# Patient Record
(demographics unavailable — no encounter records)

---

## 2024-10-30 NOTE — DATA REVIEWED
[Lumbar Spine] : lumbar spine [Thoracic Spine] : thoracic spine [MRI] : MRI [Pelvis] : pelvis [Report was reviewed and noted in the chart] : The report was reviewed and noted in the chart [I independently reviewed and interpreted images and report] : I independently reviewed and interpreted images and report [I reviewed the films/CD] : I reviewed the films/CD

## 2024-10-31 NOTE — HISTORY OF PRESENT ILLNESS
[Lower back] : lower back [6] : 6 [Radiating] : radiating [Shooting] : shooting [Throbbing] : throbbing [Constant] : constant [Household chores] : household chores [Leisure] : leisure [Sleep] : sleep [Nothing helps with pain getting better] : Nothing helps with pain getting better [FreeTextEntry1] : 10/31/2024 - Patient presents for reevaluation regarding their lower back pain. Patient reports no change in his symptoms. His pain is predominantly across his lower back and buttocks. He is experiencing an increase in subjective weakness in b/l legs which is his major concern. He followed up with Dr. Gongora to evaluate his hips and received b/l hip US CSI with no benefit.   6/3/2024 - Patient presents for FUV regarding their lower back pain. Patient reports that about 3 weeks ago he started to have a return of bilateral hip and buttock pain that radiates bilaterally down the lateral lower extremities, he continues to have pain across the lower back as well; he is currently involved in formal PT. he states that his pain had largely been resolved for over 2 months.  Sleep was improved.  3/14/2024 - Patient presents for FUV to review their pelvis MRI from 2024. Patient reports that he got 80-90% reduction of bilateral hip pain s/p GT bursa injections from the previous visit.  He no longer has right sided hip pain.  He continues to have intermittent left sided hip pain, and pain across the lower back.   2024 - Patient presents for FUV regarding his bilateral hip pain. Patient reports an increase of his right sided hip and radicular right calf pain since Thursday; laying on either side will exacerbate pain on the corresponding side. Patient will receive GTB TPI in office today.  Had MRI of the pelvis performed however no official radiological interpretation available at time of visit.  2024 - Patient presents for lead removal from [place] DCS trial 2024.  Unfortunately, he did not experience significant reduction of symptoms.  Pain is across the lower back with radiation bilaterally to the buttocks and lateral upper thighs; the pain is at its worst in the evening and night, and when laying on either side.   2024 - Patient presents for FUV regarding their lower back pain. Patient failed gabapentin and self discontinues, and he states that he is starting to have subjective weakness bilaterally in the lower extremities. His pain is across the lower back with radiation bilaterally to the buttocks and lateral upper thighs; the pain is at its worst in the evening and night.  Wishes to discuss DCS trial in further detail.  2023 - Patient presents for FUV.  He was last seen approximately 6 months ago.  Patient underwent a bilateral L4-5, L5-S1 facet joint mbb on 3/22/2023.  Patient reports no meaningful relief.  He c/o axial back pain, radiation into his BL buttocks and occasionally shoots down his leg.  Had orthopedic spine surgical reevaluation with Dr. Farfan.  At this point the patient remains uninterested in pursuing surgical intervention.  He presents today to discuss potential alternative treatment options.     3/9/2023- Patient presents for FUV regarding his lower back pain, had a lumbar spine MRI on 2023. Patient reports lower back pain, with radiation bilaterally to the buttocks and lower extremities (Pain distribution 80% back / 20% buttock/legs). He notes that the buttock pain is most prominent at night while sleeping; the lower back alternates in intensity between left and right depending on the day. He takes OTC NSAIDs for pain management with minimal reported benefit.  2023 - Presents for follow-up visit.  He was last seen for L4-L5 LESI on 10/28/2022.  He reports the most recent JENNIFER did not help at all compared to the first JENNIFER that he received.  He complains of axial back pain with radiation to his b/l buttock stopping at his calf.  He states lying flat seems to exacerbate the buttock pain.  Patient tried NSAIDs with minimal relief.  He is in the process of obtaining second opinion from spine surgery.    10/13/22 - Presents after left PM L4-L5 LESI on 22.  He reports more than 50% pain relief x 2 months, pain has since returned to baseline. Majority of his pain comes when he sleeps. Pain continues down b/l LE.  Has completed medical work-up for potential alternate sources of leg pain which have been reported as negative.  22 - Patient presents for a FUV. He reports he self d/c Celebrex due to perceived headaches and fatigue. Patient c/o low back pain radiating to the left buttock. Increase of pain with prolonged sitting. Patient reports 50/50 low back verse left buttock pain.   22 - Patient presents for a FUV following an left L4-5 LESI on 22. Patient reports injection was effective in reducing bilateral thigh symptoms. He reports 2-week benefit with both JENNIFER's. Patient reports residual low back pain radiating to the left buttocks. Patient reports he had PCP workup for leg cramping.   22 - Patient presents for a FUV following a bilateral L4-5 TFESI on 22. Patient states complete resolution the first 3 days post injection followed by minimal sustained relief. Patients' c/o bilateral leg pain / cramping with prolonged sitting. Patient states pain is worse in the evening.   22 - Patient presents for initial evaluation. Patient c/o lower back pain radiating into his bilateral buttocks and bilateral thighs. Patient rates his pain as 50-50 lower back vs lower extremity pain. Patient reports a longstanding history of this pain that has been worsening over the past 6 weeks due to no specific event. Patient recently started PT and has not noticed any appreciable reduction in his pain. Patient was prescribed Lyrica, however has not been able to start. Patient reports that sitting provokes his pain, walking does not. Patient reports that sitting provokes his lower extremity paresthesia's.   Interventional pain history (Dr. Sinha): 1) Bilateral L4-5 TFESI (22) 2) left PM L4-5 LESI (22, 22, 10/28/2022)  3) Bilateral L4-5, L5-S1 facet joint MBB (3/22/2023) 4) [place] DCS trial implant (2024) 5) Bilateral GTB Injection (2024)  Injection therapies (outside MD): 1) B/L hip joint CSI with U/S guidance - Dr. Gongora   Pertinent Surgical History: N/A  Imagin) MRI Lumbar Spine (2024)- [facility]  At L5-S1: There is facet arthropathy and trace anterolisthesis Significant spinal canal stenosis or nerve root compression. There is no significant change from previous study. At L4-5: There is facet arthropathy and grade 1 anterolisthesis. There is mild to moderate bilateral neural foraminal stenosis. There is moderate stenosis of right subarticular recess. There is no significant change from previous study. At L3-4: Disc bulge and left subarticular/foraminal disc herniation with interval increase in size from previous examination. Disc herniation compresses the descending left L4 nerve root in subarticular recess. There is also subarticular recess stenosis on the right with suspected mild impingement descending right L4 nerve root. There is moderate right and mild left neuroforaminal stenosis with disc contacting exiting nerve root on the left. At L2-3: There is minimal disc bulge and facet arthropathy without significant spinal canal or neural foraminal stenosis. At L1-2: There is disc bulge and annular fissure without significant spinal canal or neural foraminal stenosis.  2) MRI Thoracic Spine (2024) - [facility]  3) MRI Pelvis (2024) - [facility] Pelvis: T1 marrow signal intensity is maintained, without evidence for aggressive osseous lesion or fracture. There is no marrow edema. The sacroiliac joints are intact, without ankylosis or erosive change. The coccyx is intact. Hips: Both femoral heads demonstrate a spherical contour without evidence for avascular necrosis. Mild bilateral hip osteoarthritis with moderate spurring about the acetabulum is seen in association with mild collar osteophytosis. Please note that this study was not tailored to evaluate for internal derangement. No hip joint effusion is seen. Lower lumbar spine: Lower lumbar spondylosis is partially imaged in association with facet arthrosis. There is moderate stress reaction about both L4-L5 and L5-S1 facet joints. Muscles/tendons: There is no disproportionate muscle atrophy or intramuscular edema. Insertional bilateral gluteal peritendinitis is noted. There is mild bilateral chronic hamstring origin tendinosis/old partial tear. Neurovascular structures: The fat planes surrounding both sciatic nerves are maintained. No significant groin lymphadenopathy is appreciated. Miscellaneous: The subcutaneous tissues are within normal limits. Small bilateral fat-containing groin hernias are partially imaged.  Physician Disclaimer: I have personally reviewed and confirmed all HPI data with the patient. [] : no [FreeTextEntry6] : numbness [FreeTextEntry7] : rt leg [de-identified] : sleeping [de-identified] : l mri

## 2024-10-31 NOTE — PHYSICAL EXAM
[de-identified] : Constitutional:  - No acute distress  - Well developed; well nourished   Neurological:  - normal mood and affect  - alert and oriented x 3    Cardiovascular:  - grossly normal  Lumbar Spine Exam:   Inspection:  erythema (-)  ecchymosis (-)  rashes (-)  alignment: no scoliosis  Palpation:   paraspinal tenderness:                  L (+) ; R (+)  thoracic paraspinal tenderness:    L (-) ; R (-)  sciatic nerve tenderness :             L (-) ; R (-)  SI joint tenderness:                        L (-); R (-)  GTB tenderness:                            L (-); R (-)   ROM:    WNL; stiffness at extremes of extension pain with extension > flexion  Strength:                   Right       Left     Hip Flexion:                (4/5)       (4/5)  Quadriceps:               (5/5)       (5/5)  Hamstrings:                (5/5)       (5/5)  Ankle Dorsiflexion:     (4+/5)       (4+/5)  EHL:                            (5/5)       (5/5)  Ankle Plantarflexion:  (5/5)       (5/5)   Special Tests:  SLR:                      R (-); L (-)  Facet loading:       R (+); L (+)  MONTRELL test:          R (+); L (+) Tight Hamstrings   R (+); L (+)   Neurologic:  Light touch intact throughout LE bilaterally  Reflexes normal and symmetric bilaterally  Gait:  mildly- antalgic gait

## 2024-10-31 NOTE — HISTORY OF PRESENT ILLNESS
[Lower back] : lower back [6] : 6 [Radiating] : radiating [Shooting] : shooting [Throbbing] : throbbing [Constant] : constant [Household chores] : household chores [Leisure] : leisure [Sleep] : sleep [Nothing helps with pain getting better] : Nothing helps with pain getting better [FreeTextEntry1] : 10/31/2024 - Patient presents for reevaluation regarding their lower back pain. Patient reports no change in his symptoms. His pain is predominantly across his lower back and buttocks. He is experiencing an increase in subjective weakness in b/l legs which is his major concern. He followed up with Dr. Gongora to evaluate his hips and received b/l hip US CSI with no benefit.   6/3/2024 - Patient presents for FUV regarding their lower back pain. Patient reports that about 3 weeks ago he started to have a return of bilateral hip and buttock pain that radiates bilaterally down the lateral lower extremities, he continues to have pain across the lower back as well; he is currently involved in formal PT. he states that his pain had largely been resolved for over 2 months.  Sleep was improved.  3/14/2024 - Patient presents for FUV to review their pelvis MRI from 2024. Patient reports that he got 80-90% reduction of bilateral hip pain s/p GT bursa injections from the previous visit.  He no longer has right sided hip pain.  He continues to have intermittent left sided hip pain, and pain across the lower back.   2024 - Patient presents for FUV regarding his bilateral hip pain. Patient reports an increase of his right sided hip and radicular right calf pain since Thursday; laying on either side will exacerbate pain on the corresponding side. Patient will receive GTB TPI in office today.  Had MRI of the pelvis performed however no official radiological interpretation available at time of visit.  2024 - Patient presents for lead removal from [place] DCS trial 2024.  Unfortunately, he did not experience significant reduction of symptoms.  Pain is across the lower back with radiation bilaterally to the buttocks and lateral upper thighs; the pain is at its worst in the evening and night, and when laying on either side.   2024 - Patient presents for FUV regarding their lower back pain. Patient failed gabapentin and self discontinues, and he states that he is starting to have subjective weakness bilaterally in the lower extremities. His pain is across the lower back with radiation bilaterally to the buttocks and lateral upper thighs; the pain is at its worst in the evening and night.  Wishes to discuss DCS trial in further detail.  2023 - Patient presents for FUV.  He was last seen approximately 6 months ago.  Patient underwent a bilateral L4-5, L5-S1 facet joint mbb on 3/22/2023.  Patient reports no meaningful relief.  He c/o axial back pain, radiation into his BL buttocks and occasionally shoots down his leg.  Had orthopedic spine surgical reevaluation with Dr. Farfan.  At this point the patient remains uninterested in pursuing surgical intervention.  He presents today to discuss potential alternative treatment options.     3/9/2023- Patient presents for FUV regarding his lower back pain, had a lumbar spine MRI on 2023. Patient reports lower back pain, with radiation bilaterally to the buttocks and lower extremities (Pain distribution 80% back / 20% buttock/legs). He notes that the buttock pain is most prominent at night while sleeping; the lower back alternates in intensity between left and right depending on the day. He takes OTC NSAIDs for pain management with minimal reported benefit.  2023 - Presents for follow-up visit.  He was last seen for L4-L5 LESI on 10/28/2022.  He reports the most recent JENNIFER did not help at all compared to the first JENNIFER that he received.  He complains of axial back pain with radiation to his b/l buttock stopping at his calf.  He states lying flat seems to exacerbate the buttock pain.  Patient tried NSAIDs with minimal relief.  He is in the process of obtaining second opinion from spine surgery.    10/13/22 - Presents after left PM L4-L5 LESI on 22.  He reports more than 50% pain relief x 2 months, pain has since returned to baseline. Majority of his pain comes when he sleeps. Pain continues down b/l LE.  Has completed medical work-up for potential alternate sources of leg pain which have been reported as negative.  22 - Patient presents for a FUV. He reports he self d/c Celebrex due to perceived headaches and fatigue. Patient c/o low back pain radiating to the left buttock. Increase of pain with prolonged sitting. Patient reports 50/50 low back verse left buttock pain.   22 - Patient presents for a FUV following an left L4-5 LESI on 22. Patient reports injection was effective in reducing bilateral thigh symptoms. He reports 2-week benefit with both JENNIFER's. Patient reports residual low back pain radiating to the left buttocks. Patient reports he had PCP workup for leg cramping.   22 - Patient presents for a FUV following a bilateral L4-5 TFESI on 22. Patient states complete resolution the first 3 days post injection followed by minimal sustained relief. Patients' c/o bilateral leg pain / cramping with prolonged sitting. Patient states pain is worse in the evening.   22 - Patient presents for initial evaluation. Patient c/o lower back pain radiating into his bilateral buttocks and bilateral thighs. Patient rates his pain as 50-50 lower back vs lower extremity pain. Patient reports a longstanding history of this pain that has been worsening over the past 6 weeks due to no specific event. Patient recently started PT and has not noticed any appreciable reduction in his pain. Patient was prescribed Lyrica, however has not been able to start. Patient reports that sitting provokes his pain, walking does not. Patient reports that sitting provokes his lower extremity paresthesia's.   Interventional pain history (Dr. Sinha): 1) Bilateral L4-5 TFESI (22) 2) left PM L4-5 LESI (22, 22, 10/28/2022)  3) Bilateral L4-5, L5-S1 facet joint MBB (3/22/2023) 4) [place] DCS trial implant (2024) 5) Bilateral GTB Injection (2024)  Injection therapies (outside MD): 1) B/L hip joint CSI with U/S guidance - Dr. Gongora   Pertinent Surgical History: N/A  Imagin) MRI Lumbar Spine (2024)- [facility]  At L5-S1: There is facet arthropathy and trace anterolisthesis Significant spinal canal stenosis or nerve root compression. There is no significant change from previous study. At L4-5: There is facet arthropathy and grade 1 anterolisthesis. There is mild to moderate bilateral neural foraminal stenosis. There is moderate stenosis of right subarticular recess. There is no significant change from previous study. At L3-4: Disc bulge and left subarticular/foraminal disc herniation with interval increase in size from previous examination. Disc herniation compresses the descending left L4 nerve root in subarticular recess. There is also subarticular recess stenosis on the right with suspected mild impingement descending right L4 nerve root. There is moderate right and mild left neuroforaminal stenosis with disc contacting exiting nerve root on the left. At L2-3: There is minimal disc bulge and facet arthropathy without significant spinal canal or neural foraminal stenosis. At L1-2: There is disc bulge and annular fissure without significant spinal canal or neural foraminal stenosis.  2) MRI Thoracic Spine (2024) - [facility]  3) MRI Pelvis (2024) - [facility] Pelvis: T1 marrow signal intensity is maintained, without evidence for aggressive osseous lesion or fracture. There is no marrow edema. The sacroiliac joints are intact, without ankylosis or erosive change. The coccyx is intact. Hips: Both femoral heads demonstrate a spherical contour without evidence for avascular necrosis. Mild bilateral hip osteoarthritis with moderate spurring about the acetabulum is seen in association with mild collar osteophytosis. Please note that this study was not tailored to evaluate for internal derangement. No hip joint effusion is seen. Lower lumbar spine: Lower lumbar spondylosis is partially imaged in association with facet arthrosis. There is moderate stress reaction about both L4-L5 and L5-S1 facet joints. Muscles/tendons: There is no disproportionate muscle atrophy or intramuscular edema. Insertional bilateral gluteal peritendinitis is noted. There is mild bilateral chronic hamstring origin tendinosis/old partial tear. Neurovascular structures: The fat planes surrounding both sciatic nerves are maintained. No significant groin lymphadenopathy is appreciated. Miscellaneous: The subcutaneous tissues are within normal limits. Small bilateral fat-containing groin hernias are partially imaged.  Physician Disclaimer: I have personally reviewed and confirmed all HPI data with the patient. [] : no [FreeTextEntry6] : numbness [FreeTextEntry7] : rt leg [de-identified] : sleeping [de-identified] : l mri

## 2024-10-31 NOTE — ASSESSMENT
[FreeTextEntry1] : A thorough discussion occurred regarding available pain management treatment options including interventional, rehabilitative, pharmacological, and alternative modalities with the patient. We will proceed with the following:  Interventional treatment options: - None indicated at present time - would proceed with repeat bilateral GTB CSI with predominant complaints of lateral hip pain - Non-sustained relief s/p multiple attempts at lumbar JENNIFER; would not recommend further at this point - Counseled patient regarding limitations of corticosteroid administration overall - see additional instructions below  Neuromodulation treatment options: - Patient s/p [place] DCS trial - Will not move forward with permanent implant given minimal reduction of symptoms during trial  Rehabilitative options: - Completed prior physical therapy trials - encouraged participation in HEP as tolerated - home exercise sheet provided at prior visit  Medication based treatment options: - failed several OTC and prescription NSAIDs trial; he defers further consideration - Continue meloxicam 7.5-15 mg daily as needed - Continue Voltaren gel up to TID as needed - Continue topical OTC analgesics (i.e. Lidoderm patch) - Failed prior Lyrica and gabapentin trial - see additional instructions below  Complementary treatment options: - Weight management and lifestyle modifications discussed  Additional treatment recommendations as follows: - Follow-up with orthopedic surgery re: bilateral hips as directed - Advised neurology evaluation for complaints of bilateral hip flexor weakness; would likely require EMG/NCV - Patient will follow-up on as-needed basis  We have discussed the risks, benefits, and alternatives NSAID therapy including but not limited to the risk of bleeding, thrombosis, gastric mucosal irritation/ulceration, allergic reaction and kidney dysfunction; the patient verbalizes an understanding.  I, Willa Crews, acting as scribe, attest that this documentation has been prepared under the direction and in the presence of Provider Christian Sniha DO.  The documentation recorded by the scribe, in my presence, accurately reflects the service I personally performed, and the decisions made by me with my edits as appropriate.

## 2024-10-31 NOTE — ASSESSMENT
[FreeTextEntry1] : A thorough discussion occurred regarding available pain management treatment options including interventional, rehabilitative, pharmacological, and alternative modalities with the patient. We will proceed with the following:  Interventional treatment options: - None indicated at present time - would proceed with repeat bilateral GTB CSI with predominant complaints of lateral hip pain - Non-sustained relief s/p multiple attempts at lumbar JENNIFER; would not recommend further at this point - Counseled patient regarding limitations of corticosteroid administration overall - see additional instructions below  Neuromodulation treatment options: - Patient s/p [place] DCS trial - Will not move forward with permanent implant given minimal reduction of symptoms during trial  Rehabilitative options: - Completed prior physical therapy trials - encouraged participation in HEP as tolerated - home exercise sheet provided at prior visit  Medication based treatment options: - failed several OTC and prescription NSAIDs trial; he defers further consideration - Continue meloxicam 7.5-15 mg daily as needed - Continue Voltaren gel up to TID as needed - Continue topical OTC analgesics (i.e. Lidoderm patch) - Failed prior Lyrica and gabapentin trial - see additional instructions below  Complementary treatment options: - Weight management and lifestyle modifications discussed  Additional treatment recommendations as follows: - Follow-up with orthopedic surgery re: bilateral hips as directed - Advised neurology evaluation for complaints of bilateral hip flexor weakness; would likely require EMG/NCV - Patient will follow-up on as-needed basis  We have discussed the risks, benefits, and alternatives NSAID therapy including but not limited to the risk of bleeding, thrombosis, gastric mucosal irritation/ulceration, allergic reaction and kidney dysfunction; the patient verbalizes an understanding.  I, Willa Crews, acting as scribe, attest that this documentation has been prepared under the direction and in the presence of Provider Christian Sinha DO.  The documentation recorded by the scribe, in my presence, accurately reflects the service I personally performed, and the decisions made by me with my edits as appropriate.

## 2024-10-31 NOTE — PHYSICAL EXAM
[de-identified] : Constitutional:  - No acute distress  - Well developed; well nourished   Neurological:  - normal mood and affect  - alert and oriented x 3    Cardiovascular:  - grossly normal  Lumbar Spine Exam:   Inspection:  erythema (-)  ecchymosis (-)  rashes (-)  alignment: no scoliosis  Palpation:   paraspinal tenderness:                  L (+) ; R (+)  thoracic paraspinal tenderness:    L (-) ; R (-)  sciatic nerve tenderness :             L (-) ; R (-)  SI joint tenderness:                        L (-); R (-)  GTB tenderness:                            L (-); R (-)   ROM:    WNL; stiffness at extremes of extension pain with extension > flexion  Strength:                   Right       Left     Hip Flexion:                (4/5)       (4/5)  Quadriceps:               (5/5)       (5/5)  Hamstrings:                (5/5)       (5/5)  Ankle Dorsiflexion:     (4+/5)       (4+/5)  EHL:                            (5/5)       (5/5)  Ankle Plantarflexion:  (5/5)       (5/5)   Special Tests:  SLR:                      R (-); L (-)  Facet loading:       R (+); L (+)  MONTRELL test:          R (+); L (+) Tight Hamstrings   R (+); L (+)   Neurologic:  Light touch intact throughout LE bilaterally  Reflexes normal and symmetric bilaterally  Gait:  mildly- antalgic gait

## 2025-01-09 NOTE — PROCEDURE
[Large Joint Injection] : Large joint injection [Bilateral] : bilaterally of the [Pain] : pain [Ethyl Chloride sprayed topically] : ethyl chloride sprayed topically [Sterile technique used] : sterile technique used [___ cc    0.25%] : Bupivacaine (Marcaine) ~Vcc of 0.25%  [___ cc    40mg] : Triamcinolone (Kenalog) ~Vcc of 40 mg  [] : Patient tolerated procedure well [Call if redness, pain or fever occur] : call if redness, pain or fever occur [Apply ice for 15min out of every hour for the next 12-24 hours as tolerated] : apply ice for 15 minutes out of every hour for the next 12-24 hours as tolerated [Patient had decreased mobility in the joint] : patient had decreased mobility in the joint [Risks, benefits, alternatives discussed / Verbal consent obtained] : the risks benefits, and alternatives have been discussed, and verbal consent was obtained

## 2025-01-10 NOTE — PHYSICAL EXAM
[de-identified] : Constitutional:  - No acute distress  - Well developed; well nourished   Neurological:  - normal mood and affect  - alert and oriented x 3    Cardiovascular:  - grossly normal  Lumbar Spine Exam:   Inspection:  erythema (-)  ecchymosis (-)  rashes (-)  alignment: no scoliosis  Palpation:   paraspinal tenderness:                  L (-); R (-)  thoracic paraspinal tenderness:    L (-); R (-)  sciatic nerve tenderness :              L (-); R (-)  SI joint tenderness:                        L (-); R (-)  GTB tenderness:                            L (+); R (+)   ROM:   WNL; stiffness at extremes of extension pain with extension > flexion  Strength:                   Right       Left     Hip Flexion:                (4/5)       (4/5)  Quadriceps:               (5/5)       (5/5)  Hamstrings:                (5/5)       (5/5)  Ankle Dorsiflexion:     (4+/5)       (4+/5)  EHL:                            (5/5)       (5/5)  Ankle Plantarflexion:  (5/5)       (5/5)   Special Tests:  SLR:                      R (-); L (-)  Facet loading:       R (+); L (+)  MONTRELL test:          R (+); L (+) Tight Hamstrings   R (+); L (+)   Neurologic:  Light touch intact throughout LE bilaterally  Reflexes normal and symmetric bilaterally  Gait:  mildly- antalgic gait

## 2025-01-10 NOTE — ASSESSMENT
[FreeTextEntry1] : A thorough discussion occurred regarding available pain management treatment options including interventional, rehabilitative, pharmacological, and alternative modalities with the patient. We will proceed with the following:  Interventional treatment options: - proceed with repeat bilateral GTB CSI today for ongoing focal lateral hip pain - Non-sustained relief s/p multiple attempts at lumbar JENNIFER; would not recommend further at this point - Counseled patient regarding limitations of corticosteroid administration overall; he verbalizes understanding - see additional instructions below  Neuromodulation treatment options: - Prior [place] DCS trial; failed - Will not move forward with permanent implant given minimal reduction of symptoms during trial  Rehabilitative options: - Completed prior physical therapy trials - encouraged participation in HEP as tolerated - home exercise sheet provided at prior visit  Medication based treatment options: - failed several OTC and prescription NSAIDs trial; he defers further consideration - Continue meloxicam 7.5-15 mg daily as needed - Continue Voltaren gel up to TID as needed - Continue topical OTC analgesics (i.e. Lidoderm patch) - Failed prior Lyrica and gabapentin trial - see additional instructions below  Complementary treatment options: - Weight management and lifestyle modifications discussed  Additional treatment recommendations as follows: - Follow-up with orthopedic surgery (re: bilateral hips) as directed - Follow-up with neurology as directed (re: EMG/NCV results); bilateral hip flexor weakness - Patient will follow-up on as-needed basis  We have discussed the risks, benefits, and alternatives NSAID therapy including but not limited to the risk of bleeding, thrombosis, gastric mucosal irritation/ulceration, allergic reaction and kidney dysfunction; the patient verbalizes an understanding.  The risks, benefits and alternatives of the proposed procedure were explained in detail with the patient. The risks outlined include but are not limited to infection, bleeding, nerve injury, a temporary increase in pain, failure to resolve symptoms, need for future interventions, allergic reaction, and possible elevation of blood sugar in diabetics if using corticosteroid.  All questions were answered to patient's apparent satisfaction, and he/she verbalized an understanding.  I, Willa Crews, acting as scribe, attest that this documentation has been prepared under the direction and in the presence of Provider Christian Sinha DO.  The documentation recorded by the scribe, in my presence, accurately reflects the service I personally performed, and the decisions made by me with my edits as appropriate.

## 2025-01-10 NOTE — PHYSICAL EXAM
[de-identified] : Constitutional:  - No acute distress  - Well developed; well nourished   Neurological:  - normal mood and affect  - alert and oriented x 3    Cardiovascular:  - grossly normal  Lumbar Spine Exam:   Inspection:  erythema (-)  ecchymosis (-)  rashes (-)  alignment: no scoliosis  Palpation:   paraspinal tenderness:                  L (-); R (-)  thoracic paraspinal tenderness:    L (-); R (-)  sciatic nerve tenderness :              L (-); R (-)  SI joint tenderness:                        L (-); R (-)  GTB tenderness:                            L (+); R (+)   ROM:   WNL; stiffness at extremes of extension pain with extension > flexion  Strength:                   Right       Left     Hip Flexion:                (4/5)       (4/5)  Quadriceps:               (5/5)       (5/5)  Hamstrings:                (5/5)       (5/5)  Ankle Dorsiflexion:     (4+/5)       (4+/5)  EHL:                            (5/5)       (5/5)  Ankle Plantarflexion:  (5/5)       (5/5)   Special Tests:  SLR:                      R (-); L (-)  Facet loading:       R (+); L (+)  MONTRELL test:          R (+); L (+) Tight Hamstrings   R (+); L (+)   Neurologic:  Light touch intact throughout LE bilaterally  Reflexes normal and symmetric bilaterally  Gait:  mildly- antalgic gait

## 2025-01-10 NOTE — PHYSICAL EXAM
[de-identified] : Constitutional:  - No acute distress  - Well developed; well nourished   Neurological:  - normal mood and affect  - alert and oriented x 3    Cardiovascular:  - grossly normal  Lumbar Spine Exam:   Inspection:  erythema (-)  ecchymosis (-)  rashes (-)  alignment: no scoliosis  Palpation:   paraspinal tenderness:                  L (-); R (-)  thoracic paraspinal tenderness:    L (-); R (-)  sciatic nerve tenderness :              L (-); R (-)  SI joint tenderness:                        L (-); R (-)  GTB tenderness:                            L (+); R (+)   ROM:   WNL; stiffness at extremes of extension pain with extension > flexion  Strength:                   Right       Left     Hip Flexion:                (4/5)       (4/5)  Quadriceps:               (5/5)       (5/5)  Hamstrings:                (5/5)       (5/5)  Ankle Dorsiflexion:     (4+/5)       (4+/5)  EHL:                            (5/5)       (5/5)  Ankle Plantarflexion:  (5/5)       (5/5)   Special Tests:  SLR:                      R (-); L (-)  Facet loading:       R (+); L (+)  MONTRELL test:          R (+); L (+) Tight Hamstrings   R (+); L (+)   Neurologic:  Light touch intact throughout LE bilaterally  Reflexes normal and symmetric bilaterally  Gait:  mildly- antalgic gait

## 2025-01-10 NOTE — HISTORY OF PRESENT ILLNESS
[Lower back] : lower back [6] : 6 [5] : 5 [Dull/Aching] : dull/aching [Radiating] : radiating [Constant] : constant [Leisure] : leisure [Sleep] : sleep [Nothing helps with pain getting better] : Nothing helps with pain getting better [Lying in bed] : lying in bed [Retired] : Work status: retired [FreeTextEntry1] : 2025 - Patient presents for 3-month FUV regarding their lower back pain. Patient reports he continues to complain of pain across his lower back and buttocks, as well as subjective weakness in bilateral legs. He followed up with a Dr. Martinez, a neurologist who did an EMG but has not received the results. He reports the bilateral GTB TPI have been effective to reduce his pain in the past and wishes to repeat this today.  10/31/2024 - Patient presents for reevaluation regarding their lower back pain. Patient reports no change in his symptoms. His pain is predominantly across his lower back and buttocks. He is experiencing an increase in subjective weakness in b/l legs which is his major concern. He followed up with Dr. Gongora to evaluate his hips and received b/l hip US CSI with no benefit.   6/3/2024 - Patient presents for FUV regarding their lower back pain. Patient reports that about 3 weeks ago he started to have a return of bilateral hip and buttock pain that radiates bilaterally down the lateral lower extremities, he continues to have pain across the lower back as well; he is currently involved in formal PT. he states that his pain had largely been resolved for over 2 months.  Sleep was improved.  3/14/2024 - Patient presents for FUV to review their pelvis MRI from 2024. Patient reports that he got 80-90% reduction of bilateral hip pain s/p GT bursa injections from the previous visit.  He no longer has right sided hip pain.  He continues to have intermittent left sided hip pain, and pain across the lower back.   2024 - Patient presents for FUV regarding his bilateral hip pain. Patient reports an increase of his right sided hip and radicular right calf pain since Thursday; laying on either side will exacerbate pain on the corresponding side. Patient will receive GTB TPI in office today.  Had MRI of the pelvis performed however no official radiological interpretation available at time of visit.  2024 - Patient presents for lead removal from [place] DCS trial 2024.  Unfortunately, he did not experience significant reduction of symptoms.  Pain is across the lower back with radiation bilaterally to the buttocks and lateral upper thighs; the pain is at its worst in the evening and night, and when laying on either side.   2024 - Patient presents for FUV regarding their lower back pain. Patient failed gabapentin and self discontinues, and he states that he is starting to have subjective weakness bilaterally in the lower extremities. His pain is across the lower back with radiation bilaterally to the buttocks and lateral upper thighs; the pain is at its worst in the evening and night.  Wishes to discuss DCS trial in further detail.  2023 - Patient presents for FUV.  He was last seen approximately 6 months ago.  Patient underwent a bilateral L4-5, L5-S1 facet joint mbb on 3/22/2023.  Patient reports no meaningful relief.  He c/o axial back pain, radiation into his BL buttocks and occasionally shoots down his leg.  Had orthopedic spine surgical reevaluation with Dr. Farfan.  At this point the patient remains uninterested in pursuing surgical intervention.  He presents today to discuss potential alternative treatment options.     3/9/2023- Patient presents for FUV regarding his lower back pain, had a lumbar spine MRI on 2023. Patient reports lower back pain, with radiation bilaterally to the buttocks and lower extremities (Pain distribution 80% back / 20% buttock/legs). He notes that the buttock pain is most prominent at night while sleeping; the lower back alternates in intensity between left and right depending on the day. He takes OTC NSAIDs for pain management with minimal reported benefit.  2023 - Presents for follow-up visit.  He was last seen for L4-L5 LESI on 10/28/2022.  He reports the most recent JENNIFER did not help at all compared to the first JENNIFER that he received.  He complains of axial back pain with radiation to his b/l buttock stopping at his calf.  He states lying flat seems to exacerbate the buttock pain.  Patient tried NSAIDs with minimal relief.  He is in the process of obtaining second opinion from spine surgery.    10/13/22 - Presents after left PM L4-L5 LESI on 22.  He reports more than 50% pain relief x 2 months, pain has since returned to baseline. Majority of his pain comes when he sleeps. Pain continues down b/l LE.  Has completed medical work-up for potential alternate sources of leg pain which have been reported as negative.  22 - Patient presents for a FUV. He reports he self d/c Celebrex due to perceived headaches and fatigue. Patient c/o low back pain radiating to the left buttock. Increase of pain with prolonged sitting. Patient reports 50/50 low back verse left buttock pain.   22 - Patient presents for a FUV following an left L4-5 LESI on 22. Patient reports injection was effective in reducing bilateral thigh symptoms. He reports 2-week benefit with both JENNIFER's. Patient reports residual low back pain radiating to the left buttocks. Patient reports he had PCP workup for leg cramping.   22 - Patient presents for a FUV following a bilateral L4-5 TFESI on 22. Patient states complete resolution the first 3 days post injection followed by minimal sustained relief. Patients' c/o bilateral leg pain / cramping with prolonged sitting. Patient states pain is worse in the evening.   22 - Patient presents for initial evaluation. Patient c/o lower back pain radiating into his bilateral buttocks and bilateral thighs. Patient rates his pain as 50-50 lower back vs lower extremity pain. Patient reports a longstanding history of this pain that has been worsening over the past 6 weeks due to no specific event. Patient recently started PT and has not noticed any appreciable reduction in his pain. Patient was prescribed Lyrica, however has not been able to start. Patient reports that sitting provokes his pain, walking does not. Patient reports that sitting provokes his lower extremity paresthesia's.   Interventional pain history (Dr. Sinha): 1) Bilateral L4-5 TFESI (22) 2) left PM L4-5 LESI (22, 22, 10/28/2022)  3) Bilateral L4-5, L5-S1 facet joint MBB (3/22/2023) 4) [place] DCS trial (2024) 5) Bilateral GTB Injection (2024)  Injection therapies (outside MD): 1) B/L hip joint CSI with U/S guidance - (8/15/2024) Dr. Gongora   Pertinent Surgical History: N/A  Imagin) MRI Lumbar Spine (2024)- [facility]  At L5-S1: There is facet arthropathy and trace anterolisthesis Significant spinal canal stenosis or nerve root compression. There is no significant change from previous study. At L4-5: There is facet arthropathy and grade 1 anterolisthesis. There is mild to moderate bilateral neural foraminal stenosis. There is moderate stenosis of right subarticular recess. There is no significant change from previous study. At L3-4: Disc bulge and left subarticular/foraminal disc herniation with interval increase in size from previous examination. Disc herniation compresses the descending left L4 nerve root in subarticular recess. There is also subarticular recess stenosis on the right with suspected mild impingement descending right L4 nerve root. There is moderate right and mild left neuroforaminal stenosis with disc contacting exiting nerve root on the left. At L2-3: There is minimal disc bulge and facet arthropathy without significant spinal canal or neural foraminal stenosis. At L1-2: There is disc bulge and annular fissure without significant spinal canal or neural foraminal stenosis.  2) MRI Thoracic Spine (2024) - [facility]  3) MRI Pelvis (2024) - [facility] Pelvis: T1 marrow signal intensity is maintained, without evidence for aggressive osseous lesion or fracture. There is no marrow edema. The sacroiliac joints are intact, without ankylosis or erosive change. The coccyx is intact. Hips: Both femoral heads demonstrate a spherical contour without evidence for avascular necrosis. Mild bilateral hip osteoarthritis with moderate spurring about the acetabulum is seen in association with mild collar osteophytosis. Please note that this study was not tailored to evaluate for internal derangement. No hip joint effusion is seen. Lower lumbar spine: Lower lumbar spondylosis is partially imaged in association with facet arthrosis. There is moderate stress reaction about both L4-L5 and L5-S1 facet joints. Muscles/tendons: There is no disproportionate muscle atrophy or intramuscular edema. Insertional bilateral gluteal peritendinitis is noted. There is mild bilateral chronic hamstring origin tendinosis/old partial tear. Neurovascular structures: The fat planes surrounding both sciatic nerves are maintained. No significant groin lymphadenopathy is appreciated. Miscellaneous: The subcutaneous tissues are within normal limits. Small bilateral fat-containing groin hernias are partially imaged.  Physician Disclaimer: I have personally reviewed and confirmed all HPI data with the patient. [] : Post Surgical Visit: no [FreeTextEntry7] : B/L LEGS [de-identified] : L MRI AT

## 2025-01-10 NOTE — ASSESSMENT
[FreeTextEntry1] : A thorough discussion occurred regarding available pain management treatment options including interventional, rehabilitative, pharmacological, and alternative modalities with the patient. We will proceed with the following:  Interventional treatment options: - proceed with repeat bilateral GTB CSI today for ongoing focal lateral hip pain - Non-sustained relief s/p multiple attempts at lumbar JENNIFER; would not recommend further at this point - Counseled patient regarding limitations of corticosteroid administration overall; he verbalizes understanding - see additional instructions below  Neuromodulation treatment options: - Prior [place] DCS trial; failed - Will not move forward with permanent implant given minimal reduction of symptoms during trial  Rehabilitative options: - Completed prior physical therapy trials - encouraged participation in HEP as tolerated - home exercise sheet provided at prior visit  Medication based treatment options: - failed several OTC and prescription NSAIDs trial; he defers further consideration - Continue meloxicam 7.5-15 mg daily as needed - Continue Voltaren gel up to TID as needed - Continue topical OTC analgesics (i.e. Lidoderm patch) - Failed prior Lyrica and gabapentin trial - see additional instructions below  Complementary treatment options: - Weight management and lifestyle modifications discussed  Additional treatment recommendations as follows: - Follow-up with orthopedic surgery (re: bilateral hips) as directed - Follow-up with neurology as directed (re: EMG/NCV results); bilateral hip flexor weakness - Patient will follow-up on as-needed basis  We have discussed the risks, benefits, and alternatives NSAID therapy including but not limited to the risk of bleeding, thrombosis, gastric mucosal irritation/ulceration, allergic reaction and kidney dysfunction; the patient verbalizes an understanding.  The risks, benefits and alternatives of the proposed procedure were explained in detail with the patient. The risks outlined include but are not limited to infection, bleeding, nerve injury, a temporary increase in pain, failure to resolve symptoms, need for future interventions, allergic reaction, and possible elevation of blood sugar in diabetics if using corticosteroid.  All questions were answered to patient's apparent satisfaction, and he/she verbalized an understanding.  I, Willa Crews, acting as scribe, attest that this documentation has been prepared under the direction and in the presence of Provider Christian Sinha DO.  The documentation recorded by the scribe, in my presence, accurately reflects the service I personally performed, and the decisions made by me with my edits as appropriate.

## 2025-01-10 NOTE — HISTORY OF PRESENT ILLNESS
[Lower back] : lower back [6] : 6 [5] : 5 [Dull/Aching] : dull/aching [Radiating] : radiating [Constant] : constant [Leisure] : leisure [Sleep] : sleep [Nothing helps with pain getting better] : Nothing helps with pain getting better [Lying in bed] : lying in bed [Retired] : Work status: retired [FreeTextEntry1] : 2025 - Patient presents for 3-month FUV regarding their lower back pain. Patient reports he continues to complain of pain across his lower back and buttocks, as well as subjective weakness in bilateral legs. He followed up with a Dr. Martinez, a neurologist who did an EMG but has not received the results. He reports the bilateral GTB TPI have been effective to reduce his pain in the past and wishes to repeat this today.  10/31/2024 - Patient presents for reevaluation regarding their lower back pain. Patient reports no change in his symptoms. His pain is predominantly across his lower back and buttocks. He is experiencing an increase in subjective weakness in b/l legs which is his major concern. He followed up with Dr. Gongora to evaluate his hips and received b/l hip US CSI with no benefit.   6/3/2024 - Patient presents for FUV regarding their lower back pain. Patient reports that about 3 weeks ago he started to have a return of bilateral hip and buttock pain that radiates bilaterally down the lateral lower extremities, he continues to have pain across the lower back as well; he is currently involved in formal PT. he states that his pain had largely been resolved for over 2 months.  Sleep was improved.  3/14/2024 - Patient presents for FUV to review their pelvis MRI from 2024. Patient reports that he got 80-90% reduction of bilateral hip pain s/p GT bursa injections from the previous visit.  He no longer has right sided hip pain.  He continues to have intermittent left sided hip pain, and pain across the lower back.   2024 - Patient presents for FUV regarding his bilateral hip pain. Patient reports an increase of his right sided hip and radicular right calf pain since Thursday; laying on either side will exacerbate pain on the corresponding side. Patient will receive GTB TPI in office today.  Had MRI of the pelvis performed however no official radiological interpretation available at time of visit.  2024 - Patient presents for lead removal from [employer] DCS trial 2024.  Unfortunately, he did not experience significant reduction of symptoms.  Pain is across the lower back with radiation bilaterally to the buttocks and lateral upper thighs; the pain is at its worst in the evening and night, and when laying on either side.   2024 - Patient presents for FUV regarding their lower back pain. Patient failed gabapentin and self discontinues, and he states that he is starting to have subjective weakness bilaterally in the lower extremities. His pain is across the lower back with radiation bilaterally to the buttocks and lateral upper thighs; the pain is at its worst in the evening and night.  Wishes to discuss DCS trial in further detail.  2023 - Patient presents for FUV.  He was last seen approximately 6 months ago.  Patient underwent a bilateral L4-5, L5-S1 facet joint mbb on 3/22/2023.  Patient reports no meaningful relief.  He c/o axial back pain, radiation into his BL buttocks and occasionally shoots down his leg.  Had orthopedic spine surgical reevaluation with Dr. Farfan.  At this point the patient remains uninterested in pursuing surgical intervention.  He presents today to discuss potential alternative treatment options.     3/9/2023- Patient presents for FUV regarding his lower back pain, had a lumbar spine MRI on 2023. Patient reports lower back pain, with radiation bilaterally to the buttocks and lower extremities (Pain distribution 80% back / 20% buttock/legs). He notes that the buttock pain is most prominent at night while sleeping; the lower back alternates in intensity between left and right depending on the day. He takes OTC NSAIDs for pain management with minimal reported benefit.  2023 - Presents for follow-up visit.  He was last seen for L4-L5 LESI on 10/28/2022.  He reports the most recent JENNIFER did not help at all compared to the first JENNIFER that he received.  He complains of axial back pain with radiation to his b/l buttock stopping at his calf.  He states lying flat seems to exacerbate the buttock pain.  Patient tried NSAIDs with minimal relief.  He is in the process of obtaining second opinion from spine surgery.    10/13/22 - Presents after left PM L4-L5 LESI on 22.  He reports more than 50% pain relief x 2 months, pain has since returned to baseline. Majority of his pain comes when he sleeps. Pain continues down b/l LE.  Has completed medical work-up for potential alternate sources of leg pain which have been reported as negative.  22 - Patient presents for a FUV. He reports he self d/c Celebrex due to perceived headaches and fatigue. Patient c/o low back pain radiating to the left buttock. Increase of pain with prolonged sitting. Patient reports 50/50 low back verse left buttock pain.   22 - Patient presents for a FUV following an left L4-5 LESI on 22. Patient reports injection was effective in reducing bilateral thigh symptoms. He reports 2-week benefit with both JENNIFER's. Patient reports residual low back pain radiating to the left buttocks. Patient reports he had PCP workup for leg cramping.   22 - Patient presents for a FUV following a bilateral L4-5 TFESI on 22. Patient states complete resolution the first 3 days post injection followed by minimal sustained relief. Patients' c/o bilateral leg pain / cramping with prolonged sitting. Patient states pain is worse in the evening.   22 - Patient presents for initial evaluation. Patient c/o lower back pain radiating into his bilateral buttocks and bilateral thighs. Patient rates his pain as 50-50 lower back vs lower extremity pain. Patient reports a longstanding history of this pain that has been worsening over the past 6 weeks due to no specific event. Patient recently started PT and has not noticed any appreciable reduction in his pain. Patient was prescribed Lyrica, however has not been able to start. Patient reports that sitting provokes his pain, walking does not. Patient reports that sitting provokes his lower extremity paresthesia's.   Interventional pain history (Dr. Sinha): 1) Bilateral L4-5 TFESI (22) 2) left PM L4-5 LESI (22, 22, 10/28/2022)  3) Bilateral L4-5, L5-S1 facet joint MBB (3/22/2023) 4) [place] DCS trial (2024) 5) Bilateral GTB Injection (2024)  Injection therapies (outside MD): 1) B/L hip joint CSI with U/S guidance - (8/15/2024) Dr. Gongora   Pertinent Surgical History: N/A  Imagin) MRI Lumbar Spine (2024)- [facility]  At L5-S1: There is facet arthropathy and trace anterolisthesis Significant spinal canal stenosis or nerve root compression. There is no significant change from previous study. At L4-5: There is facet arthropathy and grade 1 anterolisthesis. There is mild to moderate bilateral neural foraminal stenosis. There is moderate stenosis of right subarticular recess. There is no significant change from previous study. At L3-4: Disc bulge and left subarticular/foraminal disc herniation with interval increase in size from previous examination. Disc herniation compresses the descending left L4 nerve root in subarticular recess. There is also subarticular recess stenosis on the right with suspected mild impingement descending right L4 nerve root. There is moderate right and mild left neuroforaminal stenosis with disc contacting exiting nerve root on the left. At L2-3: There is minimal disc bulge and facet arthropathy without significant spinal canal or neural foraminal stenosis. At L1-2: There is disc bulge and annular fissure without significant spinal canal or neural foraminal stenosis.  2) MRI Thoracic Spine (2024) - [facility]  3) MRI Pelvis (2024) - [facility] Pelvis: T1 marrow signal intensity is maintained, without evidence for aggressive osseous lesion or fracture. There is no marrow edema. The sacroiliac joints are intact, without ankylosis or erosive change. The coccyx is intact. Hips: Both femoral heads demonstrate a spherical contour without evidence for avascular necrosis. Mild bilateral hip osteoarthritis with moderate spurring about the acetabulum is seen in association with mild collar osteophytosis. Please note that this study was not tailored to evaluate for internal derangement. No hip joint effusion is seen. Lower lumbar spine: Lower lumbar spondylosis is partially imaged in association with facet arthrosis. There is moderate stress reaction about both L4-L5 and L5-S1 facet joints. Muscles/tendons: There is no disproportionate muscle atrophy or intramuscular edema. Insertional bilateral gluteal peritendinitis is noted. There is mild bilateral chronic hamstring origin tendinosis/old partial tear. Neurovascular structures: The fat planes surrounding both sciatic nerves are maintained. No significant groin lymphadenopathy is appreciated. Miscellaneous: The subcutaneous tissues are within normal limits. Small bilateral fat-containing groin hernias are partially imaged.  Physician Disclaimer: I have personally reviewed and confirmed all HPI data with the patient. [] : Post Surgical Visit: no [FreeTextEntry7] : B/L LEGS [de-identified] : L MRI AT

## 2025-01-10 NOTE — HISTORY OF PRESENT ILLNESS
[Lower back] : lower back [6] : 6 [5] : 5 [Dull/Aching] : dull/aching [Radiating] : radiating [Constant] : constant [Leisure] : leisure [Sleep] : sleep [Nothing helps with pain getting better] : Nothing helps with pain getting better [Lying in bed] : lying in bed [Retired] : Work status: retired [FreeTextEntry1] : 2025 - Patient presents for 3-month FUV regarding their lower back pain. Patient reports he continues to complain of pain across his lower back and buttocks, as well as subjective weakness in bilateral legs. He followed up with a Dr. Martinez, a neurologist who did an EMG but has not received the results. He reports the bilateral GTB TPI have been effective to reduce his pain in the past and wishes to repeat this today.  10/31/2024 - Patient presents for reevaluation regarding their lower back pain. Patient reports no change in his symptoms. His pain is predominantly across his lower back and buttocks. He is experiencing an increase in subjective weakness in b/l legs which is his major concern. He followed up with Dr. Gongora to evaluate his hips and received b/l hip US CSI with no benefit.   6/3/2024 - Patient presents for FUV regarding their lower back pain. Patient reports that about 3 weeks ago he started to have a return of bilateral hip and buttock pain that radiates bilaterally down the lateral lower extremities, he continues to have pain across the lower back as well; he is currently involved in formal PT. he states that his pain had largely been resolved for over 2 months.  Sleep was improved.  3/14/2024 - Patient presents for FUV to review their pelvis MRI from 2024. Patient reports that he got 80-90% reduction of bilateral hip pain s/p GT bursa injections from the previous visit.  He no longer has right sided hip pain.  He continues to have intermittent left sided hip pain, and pain across the lower back.   2024 - Patient presents for FUV regarding his bilateral hip pain. Patient reports an increase of his right sided hip and radicular right calf pain since Thursday; laying on either side will exacerbate pain on the corresponding side. Patient will receive GTB TPI in office today.  Had MRI of the pelvis performed however no official radiological interpretation available at time of visit.  2024 - Patient presents for lead removal from [employer] DCS trial 2024.  Unfortunately, he did not experience significant reduction of symptoms.  Pain is across the lower back with radiation bilaterally to the buttocks and lateral upper thighs; the pain is at its worst in the evening and night, and when laying on either side.   2024 - Patient presents for FUV regarding their lower back pain. Patient failed gabapentin and self discontinues, and he states that he is starting to have subjective weakness bilaterally in the lower extremities. His pain is across the lower back with radiation bilaterally to the buttocks and lateral upper thighs; the pain is at its worst in the evening and night.  Wishes to discuss DCS trial in further detail.  2023 - Patient presents for FUV.  He was last seen approximately 6 months ago.  Patient underwent a bilateral L4-5, L5-S1 facet joint mbb on 3/22/2023.  Patient reports no meaningful relief.  He c/o axial back pain, radiation into his BL buttocks and occasionally shoots down his leg.  Had orthopedic spine surgical reevaluation with Dr. Farfan.  At this point the patient remains uninterested in pursuing surgical intervention.  He presents today to discuss potential alternative treatment options.     3/9/2023- Patient presents for FUV regarding his lower back pain, had a lumbar spine MRI on 2023. Patient reports lower back pain, with radiation bilaterally to the buttocks and lower extremities (Pain distribution 80% back / 20% buttock/legs). He notes that the buttock pain is most prominent at night while sleeping; the lower back alternates in intensity between left and right depending on the day. He takes OTC NSAIDs for pain management with minimal reported benefit.  2023 - Presents for follow-up visit.  He was last seen for L4-L5 LESI on 10/28/2022.  He reports the most recent JENNIFER did not help at all compared to the first JENNIFER that he received.  He complains of axial back pain with radiation to his b/l buttock stopping at his calf.  He states lying flat seems to exacerbate the buttock pain.  Patient tried NSAIDs with minimal relief.  He is in the process of obtaining second opinion from spine surgery.    10/13/22 - Presents after left PM L4-L5 LESI on 22.  He reports more than 50% pain relief x 2 months, pain has since returned to baseline. Majority of his pain comes when he sleeps. Pain continues down b/l LE.  Has completed medical work-up for potential alternate sources of leg pain which have been reported as negative.  22 - Patient presents for a FUV. He reports he self d/c Celebrex due to perceived headaches and fatigue. Patient c/o low back pain radiating to the left buttock. Increase of pain with prolonged sitting. Patient reports 50/50 low back verse left buttock pain.   22 - Patient presents for a FUV following an left L4-5 LESI on 22. Patient reports injection was effective in reducing bilateral thigh symptoms. He reports 2-week benefit with both JENNIFER's. Patient reports residual low back pain radiating to the left buttocks. Patient reports he had PCP workup for leg cramping.   22 - Patient presents for a FUV following a bilateral L4-5 TFESI on 22. Patient states complete resolution the first 3 days post injection followed by minimal sustained relief. Patients' c/o bilateral leg pain / cramping with prolonged sitting. Patient states pain is worse in the evening.   22 - Patient presents for initial evaluation. Patient c/o lower back pain radiating into his bilateral buttocks and bilateral thighs. Patient rates his pain as 50-50 lower back vs lower extremity pain. Patient reports a longstanding history of this pain that has been worsening over the past 6 weeks due to no specific event. Patient recently started PT and has not noticed any appreciable reduction in his pain. Patient was prescribed Lyrica, however has not been able to start. Patient reports that sitting provokes his pain, walking does not. Patient reports that sitting provokes his lower extremity paresthesia's.   Interventional pain history (Dr. Sinha): 1) Bilateral L4-5 TFESI (22) 2) left PM L4-5 LESI (22, 22, 10/28/2022)  3) Bilateral L4-5, L5-S1 facet joint MBB (3/22/2023) 4) [place] DCS trial (2024) 5) Bilateral GTB Injection (2024)  Injection therapies (outside MD): 1) B/L hip joint CSI with U/S guidance - (8/15/2024) Dr. Gongora   Pertinent Surgical History: N/A  Imagin) MRI Lumbar Spine (2024)- [facility]  At L5-S1: There is facet arthropathy and trace anterolisthesis Significant spinal canal stenosis or nerve root compression. There is no significant change from previous study. At L4-5: There is facet arthropathy and grade 1 anterolisthesis. There is mild to moderate bilateral neural foraminal stenosis. There is moderate stenosis of right subarticular recess. There is no significant change from previous study. At L3-4: Disc bulge and left subarticular/foraminal disc herniation with interval increase in size from previous examination. Disc herniation compresses the descending left L4 nerve root in subarticular recess. There is also subarticular recess stenosis on the right with suspected mild impingement descending right L4 nerve root. There is moderate right and mild left neuroforaminal stenosis with disc contacting exiting nerve root on the left. At L2-3: There is minimal disc bulge and facet arthropathy without significant spinal canal or neural foraminal stenosis. At L1-2: There is disc bulge and annular fissure without significant spinal canal or neural foraminal stenosis.  2) MRI Thoracic Spine (2024) - [facility]  3) MRI Pelvis (2024) - [facility] Pelvis: T1 marrow signal intensity is maintained, without evidence for aggressive osseous lesion or fracture. There is no marrow edema. The sacroiliac joints are intact, without ankylosis or erosive change. The coccyx is intact. Hips: Both femoral heads demonstrate a spherical contour without evidence for avascular necrosis. Mild bilateral hip osteoarthritis with moderate spurring about the acetabulum is seen in association with mild collar osteophytosis. Please note that this study was not tailored to evaluate for internal derangement. No hip joint effusion is seen. Lower lumbar spine: Lower lumbar spondylosis is partially imaged in association with facet arthrosis. There is moderate stress reaction about both L4-L5 and L5-S1 facet joints. Muscles/tendons: There is no disproportionate muscle atrophy or intramuscular edema. Insertional bilateral gluteal peritendinitis is noted. There is mild bilateral chronic hamstring origin tendinosis/old partial tear. Neurovascular structures: The fat planes surrounding both sciatic nerves are maintained. No significant groin lymphadenopathy is appreciated. Miscellaneous: The subcutaneous tissues are within normal limits. Small bilateral fat-containing groin hernias are partially imaged.  Physician Disclaimer: I have personally reviewed and confirmed all HPI data with the patient. [] : Post Surgical Visit: no [FreeTextEntry7] : B/L LEGS [de-identified] : L MRI AT

## 2025-01-10 NOTE — REASON FOR VISIT
[Follow-Up Visit] : a follow-up pain management visit [FreeTextEntry2] : Low back/bilateral thigh pain

## 2025-01-10 NOTE — ASSESSMENT
[FreeTextEntry1] : A thorough discussion occurred regarding available pain management treatment options including interventional, rehabilitative, pharmacological, and alternative modalities with the patient. We will proceed with the following:  Interventional treatment options: - proceed with repeat bilateral GTB CSI today for ongoing focal lateral hip pain - Non-sustained relief s/p multiple attempts at lumbar JENNIFER; would not recommend further at this point - Counseled patient regarding limitations of corticosteroid administration overall; he verbalizes understanding - see additional instructions below  Neuromodulation treatment options: - Prior [place] DCS trial; failed - Will not move forward with permanent implant given minimal reduction of symptoms during trial  Rehabilitative options: - Completed prior physical therapy trials - encouraged participation in HEP as tolerated - home exercise sheet provided at prior visit  Medication based treatment options: - failed several OTC and prescription NSAIDs trial; he defers further consideration - Continue meloxicam 7.5-15 mg daily as needed - Continue Voltaren gel up to TID as needed - Continue topical OTC analgesics (i.e. Lidoderm patch) - Failed prior Lyrica and gabapentin trial - see additional instructions below  Complementary treatment options: - Weight management and lifestyle modifications discussed  Additional treatment recommendations as follows: - Follow-up with orthopedic surgery (re: bilateral hips) as directed - Follow-up with neurology as directed (re: EMG/NCV results); bilateral hip flexor weakness - Patient will follow-up on as-needed basis  We have discussed the risks, benefits, and alternatives NSAID therapy including but not limited to the risk of bleeding, thrombosis, gastric mucosal irritation/ulceration, allergic reaction and kidney dysfunction; the patient verbalizes an understanding.  The risks, benefits and alternatives of the proposed procedure were explained in detail with the patient. The risks outlined include but are not limited to infection, bleeding, nerve injury, a temporary increase in pain, failure to resolve symptoms, need for future interventions, allergic reaction, and possible elevation of blood sugar in diabetics if using corticosteroid.  All questions were answered to patient's apparent satisfaction, and he/she verbalized an understanding.  I, Wilal Crews, acting as scribe, attest that this documentation has been prepared under the direction and in the presence of Provider Christian Sinha DO.  The documentation recorded by the scribe, in my presence, accurately reflects the service I personally performed, and the decisions made by me with my edits as appropriate.

## 2025-03-27 NOTE — REVIEW OF SYSTEMS
well developed, well nourished , in no acute distress , normal communication ability [Negative] : Heme/Lymph

## 2025-04-10 NOTE — DATA REVIEWED
[Lumbar Spine] : lumbar spine [Thoracic Spine] : thoracic spine [MRI] : MRI [Report was reviewed and noted in the chart] : The report was reviewed and noted in the chart [I independently reviewed and interpreted images and report] : I independently reviewed and interpreted images and report [I reviewed the films/CD] : I reviewed the films/CD [Pelvis] : pelvis

## 2025-04-10 NOTE — REASON FOR VISIT
[Follow-Up Visit] : a follow-up pain management visit [FreeTextEntry2] : Low back/bilateral hip pain

## 2025-04-10 NOTE — PHYSICAL EXAM
[de-identified] : Constitutional:  - No acute distress  - Well developed; well nourished   Neurological:  - normal mood and affect  - alert and oriented x 3    Cardiovascular:  - grossly normal  Lumbar Spine Exam:   Inspection:  erythema (-)  ecchymosis (-)  rashes (-)  alignment: no scoliosis  Palpation:   paraspinal tenderness:                  L (-); R (-)  thoracic paraspinal tenderness:    L (-); R (-)  sciatic nerve tenderness :              L (-); R (-)  SI joint tenderness:                        L (-); R (-)  GTB tenderness:                            L (+); R (+)   ROM:   WNL; stiffness at extremes of extension pain with extension > flexion  Strength:                   Right       Left     Hip Flexion:                 (5/5)       (5/5)  Quadriceps:                (5/5)       (5/5)  Hamstrings:                 (5/5)       (5/5)  Ankle Dorsiflexion:      (4+/5)       (4+/5)  EHL:                            (5/5)       (5/5)  Ankle Plantarflexion:   (5/5)      (5/5)   Special Tests:  SLR:                      R (-); L (-)  Facet loading:       R (+); L (+)  MONTRELL test:          R (+); L (+) Tight Hamstrings   R (+); L (+)   Neurologic:  Light touch intact throughout LE bilaterally  Reflexes normal and symmetric bilaterally  Gait:  mildly- antalgic gait

## 2025-04-10 NOTE — HISTORY OF PRESENT ILLNESS
[Lower back] : lower back [6] : 6 [Dull/Aching] : dull/aching [Radiating] : radiating [Sharp] : sharp [Shooting] : shooting [Stabbing] : stabbing [Constant] : constant [Household chores] : household chores [Leisure] : leisure [Sleep] : sleep [Standing] : standing [Walking] : walking [FreeTextEntry1] : 4/10/2025 - Patient presents for 3-month FUV. Patient reports significant, near resolution, of bilateral lateral hip pain when sleeping at night for ~ 1 month s/p BL GTB injections on 2025. Patient continues to report chronic axial low back pain when sleeping and when arising in the morning.  Low back pain worsens with prolonged seating, transitioning from sitting to standing positioning and with extension.  Denies any alarm signs of changes in medical history since last office visit.  Of note, patient was seen and evaluated by neurology and was started on pregabalin 50 mg TID without perceived benefit.  EMG/NCV was completed as well.  Presents today for repeat BL GTB injection and to discuss next steps in treatment plan.   2025 - Patient presents for 3-month FUV regarding their lower back pain. Patient reports he continues to complain of pain across his lower back and buttocks, as well as subjective weakness in bilateral legs. He followed up with a Dr. Martinez, a neurologist who did an EMG but has not received the results. He reports the bilateral GTB TPI have been effective to reduce his pain in the past and wishes to repeat this today.  10/31/2024 - Patient presents for reevaluation regarding their lower back pain. Patient reports no change in his symptoms. His pain is predominantly across his lower back and buttocks. He is experiencing an increase in subjective weakness in b/l legs which is his major concern. He followed up with Dr. Gongora to evaluate his hips and received b/l hip US CSI with no benefit.   6/3/2024 - Patient presents for FUV regarding their lower back pain. Patient reports that about 3 weeks ago he started to have a return of bilateral hip and buttock pain that radiates bilaterally down the lateral lower extremities, he continues to have pain across the lower back as well; he is currently involved in formal PT. he states that his pain had largely been resolved for over 2 months.  Sleep was improved.  3/14/2024 - Patient presents for FUV to review their pelvis MRI from 2024. Patient reports that he got 80-90% reduction of bilateral hip pain s/p GT bursa injections from the previous visit.  He no longer has right sided hip pain.  He continues to have intermittent left sided hip pain, and pain across the lower back.   2024 - Patient presents for FUV regarding his bilateral hip pain. Patient reports an increase of his right sided hip and radicular right calf pain since Thursday; laying on either side will exacerbate pain on the corresponding side. Patient will receive GTB TPI in office today.  Had MRI of the pelvis performed however no official radiological interpretation available at time of visit.  2024 - Patient presents for lead removal from [place] DCS trial 2024.  Unfortunately, he did not experience significant reduction of symptoms.  Pain is across the lower back with radiation bilaterally to the buttocks and lateral upper thighs; the pain is at its worst in the evening and night, and when laying on either side.   2024 - Patient presents for FUV regarding their lower back pain. Patient failed gabapentin and self discontinues, and he states that he is starting to have subjective weakness bilaterally in the lower extremities. His pain is across the lower back with radiation bilaterally to the buttocks and lateral upper thighs; the pain is at its worst in the evening and night.  Wishes to discuss DCS trial in further detail.  2023 - Patient presents for FUV.  He was last seen approximately 6 months ago.  Patient underwent a bilateral L4-5, L5-S1 facet joint mbb on 3/22/2023.  Patient reports no meaningful relief.  He c/o axial back pain, radiation into his BL buttocks and occasionally shoots down his leg.  Had orthopedic spine surgical reevaluation with Dr. Farfan.  At this point the patient remains uninterested in pursuing surgical intervention.  He presents today to discuss potential alternative treatment options.     3/9/2023- Patient presents for FUV regarding his lower back pain, had a lumbar spine MRI on 2023. Patient reports lower back pain, with radiation bilaterally to the buttocks and lower extremities (Pain distribution 80% back / 20% buttock/legs). He notes that the buttock pain is most prominent at night while sleeping; the lower back alternates in intensity between left and right depending on the day. He takes OTC NSAIDs for pain management with minimal reported benefit.  2023 - Presents for follow-up visit.  He was last seen for L4-L5 LESI on 10/28/2022.  He reports the most recent JENNIFER did not help at all compared to the first JENNIFER that he received.  He complains of axial back pain with radiation to his b/l buttock stopping at his calf.  He states lying flat seems to exacerbate the buttock pain.  Patient tried NSAIDs with minimal relief.  He is in the process of obtaining second opinion from spine surgery.    10/13/22 - Presents after left PM L4-L5 LESI on 22.  He reports more than 50% pain relief x 2 months, pain has since returned to baseline. Majority of his pain comes when he sleeps. Pain continues down b/l LE.  Has completed medical work-up for potential alternate sources of leg pain which have been reported as negative.  22 - Patient presents for a FUV. He reports he self d/c Celebrex due to perceived headaches and fatigue. Patient c/o low back pain radiating to the left buttock. Increase of pain with prolonged sitting. Patient reports 50/50 low back verse left buttock pain.   22 - Patient presents for a FUV following an left L4-5 LESI on 22. Patient reports injection was effective in reducing bilateral thigh symptoms. He reports 2-week benefit with both JENNIFER's. Patient reports residual low back pain radiating to the left buttocks. Patient reports he had PCP workup for leg cramping.   22 - Patient presents for a FUV following a bilateral L4-5 TFESI on 22. Patient states complete resolution the first 3 days post injection followed by minimal sustained relief. Patients' c/o bilateral leg pain / cramping with prolonged sitting. Patient states pain is worse in the evening.   22 - Patient presents for initial evaluation. Patient c/o lower back pain radiating into his bilateral buttocks and bilateral thighs. Patient rates his pain as 50-50 lower back vs lower extremity pain. Patient reports a longstanding history of this pain that has been worsening over the past 6 weeks due to no specific event. Patient recently started PT and has not noticed any appreciable reduction in his pain. Patient was prescribed Lyrica, however has not been able to start. Patient reports that sitting provokes his pain, walking does not. Patient reports that sitting provokes his lower extremity paresthesia's.   Interventional pain history (Dr. Sinha): 1) Bilateral L4-5 TFESI (22) 2) L4-5 LESI (22, 22, 10/28/2022)  3) Bilateral L4-5, L5-S1 facet joint MBB (3/22/2023) 4) [place] DCS trial (2024) 5) Bilateral GTB Injection (2024)  Injection therapies (outside MD): 1) B/L hip joint CSI with U/S guidance - (8/15/2024) Dr. Gongora   Pertinent Surgical History: N/A  Imagin) MRI Lumbar Spine (2024)- [facility]  At L5-S1: There is facet arthropathy and trace anterolisthesis Significant spinal canal stenosis or nerve root compression. There is no significant change from previous study. At L4-5: There is facet arthropathy and grade 1 anterolisthesis. There is mild to moderate bilateral neural foraminal stenosis. There is moderate stenosis of right subarticular recess. There is no significant change from previous study. At L3-4: Disc bulge and left subarticular/foraminal disc herniation with interval increase in size from previous examination. Disc herniation compresses the descending left L4 nerve root in subarticular recess. There is also subarticular recess stenosis on the right with suspected mild impingement descending right L4 nerve root. There is moderate right and mild left neuroforaminal stenosis with disc contacting exiting nerve root on the left. At L2-3: There is minimal disc bulge and facet arthropathy without significant spinal canal or neural foraminal stenosis. At L1-2: There is disc bulge and annular fissure without significant spinal canal or neural foraminal stenosis.  2) MRI Thoracic Spine (2024) - [facility]  3) MRI Pelvis (2024) - [facility]  Pelvis: T1 marrow signal intensity is maintained, without evidence for aggressive osseous lesion or fracture. There is no marrow edema. The sacroiliac joints are intact, without ankylosis or erosive change. The coccyx is intact. Hips: Both femoral heads demonstrate a spherical contour without evidence for avascular necrosis. Mild bilateral hip osteoarthritis with moderate spurring about the acetabulum is seen in association with mild collar osteophytosis. Please note that this study was not tailored to evaluate for internal derangement. No hip joint effusion is seen. Lower lumbar spine: Lower lumbar spondylosis is partially imaged in association with facet arthrosis. There is moderate stress reaction about both L4-L5 and L5-S1 facet joints. Muscles/tendons: There is no disproportionate muscle atrophy or intramuscular edema. Insertional bilateral gluteal peritendinitis is noted. There is mild bilateral chronic hamstring origin tendinosis/old partial tear. Neurovascular structures: The fat planes surrounding both sciatic nerves are maintained. No significant groin lymphadenopathy is appreciated. Miscellaneous: The subcutaneous tissues are within normal limits. Small bilateral fat-containing groin hernias are partially imaged.  Neurodiagnostic Imagin) EMG/NCV (2024) - [facility] Right > left lumbar radiculopathy likely at L4/L5 with active and chronic findings  Physician Disclaimer: I have personally reviewed and confirmed all HPI data with the patient. [] : Post Surgical Visit: no [FreeTextEntry7] : B/L THIGH  [FreeTextEntry9] : Bursa injection [de-identified] : L MRI AT

## 2025-04-10 NOTE — PHYSICAL EXAM
[de-identified] : Constitutional:  - No acute distress  - Well developed; well nourished   Neurological:  - normal mood and affect  - alert and oriented x 3    Cardiovascular:  - grossly normal  Lumbar Spine Exam:   Inspection:  erythema (-)  ecchymosis (-)  rashes (-)  alignment: no scoliosis  Palpation:   paraspinal tenderness:                  L (-); R (-)  thoracic paraspinal tenderness:    L (-); R (-)  sciatic nerve tenderness :              L (-); R (-)  SI joint tenderness:                        L (-); R (-)  GTB tenderness:                            L (+); R (+)   ROM:   WNL; stiffness at extremes of extension pain with extension > flexion  Strength:                   Right       Left     Hip Flexion:                 (5/5)       (5/5)  Quadriceps:                (5/5)       (5/5)  Hamstrings:                 (5/5)       (5/5)  Ankle Dorsiflexion:      (4+/5)       (4+/5)  EHL:                            (5/5)       (5/5)  Ankle Plantarflexion:   (5/5)      (5/5)   Special Tests:  SLR:                      R (-); L (-)  Facet loading:       R (+); L (+)  MONTRELL test:          R (+); L (+) Tight Hamstrings   R (+); L (+)   Neurologic:  Light touch intact throughout LE bilaterally  Reflexes normal and symmetric bilaterally  Gait:  mildly- antalgic gait

## 2025-04-10 NOTE — ASSESSMENT
[FreeTextEntry1] : A thorough discussion occurred regarding available pain management treatment options including interventional, rehabilitative, pharmacological, and alternative modalities with the patient. We will proceed with the following:  Interventional treatment options: - proceed with repeat bilateral GTB CSI today for ongoing focal lateral hip pain - Proceed with bilateral L4-L5, L5-S1 facet joint MBB with fluoroscopic guidance, would proceed to RFA if adequate relief - Counseled patient regarding limitations of corticosteroid administration overall; he verbalizes understanding - see additional instructions below  Neuromodulation treatment options: - Prior [place] DCS trial; failed - Will not move forward with permanent implant given minimal reduction of symptoms during trial  Rehabilitative options: - Completed prior physical therapy trials - encouraged participation in HEP as tolerated - home exercise sheet provided at prior visit  Medication based treatment options: - failed several OTC and prescription NSAIDs trial; he defers further consideration - Continue meloxicam 7.5-15 mg daily as needed - Continue Voltaren gel up to TID as needed - Continue topical OTC analgesics (i.e. Lidoderm patch) - Continue pregabalin 50 mg TID; advised to discuss further titration with neurologist - see additional instructions below  Complementary treatment options: - Weight management and lifestyle modifications discussed  Additional treatment recommendations as follows: - Follow-up with orthopedic surgery (re: bilateral hips) as directed - Patient completed neurology workup regarding subjective leg weakness - Follow up 1-2 weeks post injection for assessment of efficacy and further treatment recommendations  We have discussed the risks, benefits, and alternatives NSAID therapy including but not limited to the risk of bleeding, thrombosis, gastric mucosal irritation/ulceration, allergic reaction and kidney dysfunction; the patient verbalizes an understanding.  The risks, benefits and alternatives of the proposed procedure were explained in detail with the patient. The risks outlined include but are not limited to infection, bleeding, nerve injury, a temporary increase in pain, failure to resolve symptoms, need for future interventions, allergic reaction, and possible elevation of blood sugar in diabetics if using corticosteroid.  All questions were answered to patient's apparent satisfaction, and he/she verbalized an understanding.  Patient presents with axial lumbar pain that has not responded to 3 months of conservative therapy including physical therapy or NSAID therapy.  The pain is interfering with activities of daily living and functionality.  There is no radicular pain.  The pain is exacerbated by facet loading.  Positive Kemps maneuver which is defined by pain reproduction with extension and rotation of the lumbar spine to the affected side.  The patient has not had a vertebral fusion at the levels of the proposed treatment.  There is no unexplained neurologic deficit.  There is no history of systemic infection, unstable medical condition, bleeding tendency, or local infection.  The injection is being performed to diagnose the facet joint as the source of the individual's pain, in preparation for a radiofrequency ablation.  I, Luly Segovia NP, acting as scribe, attest that this documentation has been prepared under the direction and in the presence of Provider Christian Sinha DO.    The documentation recorded by the scribe, in my presence, accurately reflects the service I personally performed, and the decisions made by me with my edits as appropriate.

## 2025-04-10 NOTE — HISTORY OF PRESENT ILLNESS
[Lower back] : lower back [6] : 6 [Dull/Aching] : dull/aching [Radiating] : radiating [Sharp] : sharp [Shooting] : shooting [Stabbing] : stabbing [Constant] : constant [Household chores] : household chores [Leisure] : leisure [Sleep] : sleep [Standing] : standing [Walking] : walking [FreeTextEntry1] : 4/10/2025 - Patient presents for 3-month FUV. Patient reports significant, near resolution, of bilateral lateral hip pain when sleeping at night for ~ 1 month s/p BL GTB injections on 2025. Patient continues to report chronic axial low back pain when sleeping and when arising in the morning.  Low back pain worsens with prolonged seating, transitioning from sitting to standing positioning and with extension.  Denies any alarm signs of changes in medical history since last office visit.  Of note, patient was seen and evaluated by neurology and was started on pregabalin 50 mg TID without perceived benefit.  EMG/NCV was completed as well.  Presents today for repeat BL GTB injection and to discuss next steps in treatment plan.   2025 - Patient presents for 3-month FUV regarding their lower back pain. Patient reports he continues to complain of pain across his lower back and buttocks, as well as subjective weakness in bilateral legs. He followed up with a Dr. Martinez, a neurologist who did an EMG but has not received the results. He reports the bilateral GTB TPI have been effective to reduce his pain in the past and wishes to repeat this today.  10/31/2024 - Patient presents for reevaluation regarding their lower back pain. Patient reports no change in his symptoms. His pain is predominantly across his lower back and buttocks. He is experiencing an increase in subjective weakness in b/l legs which is his major concern. He followed up with Dr. Gongora to evaluate his hips and received b/l hip US CSI with no benefit.   6/3/2024 - Patient presents for FUV regarding their lower back pain. Patient reports that about 3 weeks ago he started to have a return of bilateral hip and buttock pain that radiates bilaterally down the lateral lower extremities, he continues to have pain across the lower back as well; he is currently involved in formal PT. he states that his pain had largely been resolved for over 2 months.  Sleep was improved.  3/14/2024 - Patient presents for FUV to review their pelvis MRI from 2024. Patient reports that he got 80-90% reduction of bilateral hip pain s/p GT bursa injections from the previous visit.  He no longer has right sided hip pain.  He continues to have intermittent left sided hip pain, and pain across the lower back.   2024 - Patient presents for FUV regarding his bilateral hip pain. Patient reports an increase of his right sided hip and radicular right calf pain since Thursday; laying on either side will exacerbate pain on the corresponding side. Patient will receive GTB TPI in office today.  Had MRI of the pelvis performed however no official radiological interpretation available at time of visit.  2024 - Patient presents for lead removal from [place] DCS trial 2024.  Unfortunately, he did not experience significant reduction of symptoms.  Pain is across the lower back with radiation bilaterally to the buttocks and lateral upper thighs; the pain is at its worst in the evening and night, and when laying on either side.   2024 - Patient presents for FUV regarding their lower back pain. Patient failed gabapentin and self discontinues, and he states that he is starting to have subjective weakness bilaterally in the lower extremities. His pain is across the lower back with radiation bilaterally to the buttocks and lateral upper thighs; the pain is at its worst in the evening and night.  Wishes to discuss DCS trial in further detail.  2023 - Patient presents for FUV.  He was last seen approximately 6 months ago.  Patient underwent a bilateral L4-5, L5-S1 facet joint mbb on 3/22/2023.  Patient reports no meaningful relief.  He c/o axial back pain, radiation into his BL buttocks and occasionally shoots down his leg.  Had orthopedic spine surgical reevaluation with Dr. Farfan.  At this point the patient remains uninterested in pursuing surgical intervention.  He presents today to discuss potential alternative treatment options.     3/9/2023- Patient presents for FUV regarding his lower back pain, had a lumbar spine MRI on 2023. Patient reports lower back pain, with radiation bilaterally to the buttocks and lower extremities (Pain distribution 80% back / 20% buttock/legs). He notes that the buttock pain is most prominent at night while sleeping; the lower back alternates in intensity between left and right depending on the day. He takes OTC NSAIDs for pain management with minimal reported benefit.  2023 - Presents for follow-up visit.  He was last seen for L4-L5 LESI on 10/28/2022.  He reports the most recent JENNIFER did not help at all compared to the first JENNIFER that he received.  He complains of axial back pain with radiation to his b/l buttock stopping at his calf.  He states lying flat seems to exacerbate the buttock pain.  Patient tried NSAIDs with minimal relief.  He is in the process of obtaining second opinion from spine surgery.    10/13/22 - Presents after left PM L4-L5 LESI on 22.  He reports more than 50% pain relief x 2 months, pain has since returned to baseline. Majority of his pain comes when he sleeps. Pain continues down b/l LE.  Has completed medical work-up for potential alternate sources of leg pain which have been reported as negative.  22 - Patient presents for a FUV. He reports he self d/c Celebrex due to perceived headaches and fatigue. Patient c/o low back pain radiating to the left buttock. Increase of pain with prolonged sitting. Patient reports 50/50 low back verse left buttock pain.   22 - Patient presents for a FUV following an left L4-5 LESI on 22. Patient reports injection was effective in reducing bilateral thigh symptoms. He reports 2-week benefit with both JENNIFER's. Patient reports residual low back pain radiating to the left buttocks. Patient reports he had PCP workup for leg cramping.   22 - Patient presents for a FUV following a bilateral L4-5 TFESI on 22. Patient states complete resolution the first 3 days post injection followed by minimal sustained relief. Patients' c/o bilateral leg pain / cramping with prolonged sitting. Patient states pain is worse in the evening.   22 - Patient presents for initial evaluation. Patient c/o lower back pain radiating into his bilateral buttocks and bilateral thighs. Patient rates his pain as 50-50 lower back vs lower extremity pain. Patient reports a longstanding history of this pain that has been worsening over the past 6 weeks due to no specific event. Patient recently started PT and has not noticed any appreciable reduction in his pain. Patient was prescribed Lyrica, however has not been able to start. Patient reports that sitting provokes his pain, walking does not. Patient reports that sitting provokes his lower extremity paresthesia's.   Interventional pain history (Dr. Sinha): 1) Bilateral L4-5 TFESI (22) 2) L4-5 LESI (22, 22, 10/28/2022)  3) Bilateral L4-5, L5-S1 facet joint MBB (3/22/2023) 4) [place] DCS trial (2024) 5) Bilateral GTB Injection (2024)  Injection therapies (outside MD): 1) B/L hip joint CSI with U/S guidance - (8/15/2024) Dr. Gongora   Pertinent Surgical History: N/A  Imagin) MRI Lumbar Spine (2024)- [facility]  At L5-S1: There is facet arthropathy and trace anterolisthesis Significant spinal canal stenosis or nerve root compression. There is no significant change from previous study. At L4-5: There is facet arthropathy and grade 1 anterolisthesis. There is mild to moderate bilateral neural foraminal stenosis. There is moderate stenosis of right subarticular recess. There is no significant change from previous study. At L3-4: Disc bulge and left subarticular/foraminal disc herniation with interval increase in size from previous examination. Disc herniation compresses the descending left L4 nerve root in subarticular recess. There is also subarticular recess stenosis on the right with suspected mild impingement descending right L4 nerve root. There is moderate right and mild left neuroforaminal stenosis with disc contacting exiting nerve root on the left. At L2-3: There is minimal disc bulge and facet arthropathy without significant spinal canal or neural foraminal stenosis. At L1-2: There is disc bulge and annular fissure without significant spinal canal or neural foraminal stenosis.  2) MRI Thoracic Spine (2024) - [facility]  3) MRI Pelvis (2024) - [facility]  Pelvis: T1 marrow signal intensity is maintained, without evidence for aggressive osseous lesion or fracture. There is no marrow edema. The sacroiliac joints are intact, without ankylosis or erosive change. The coccyx is intact. Hips: Both femoral heads demonstrate a spherical contour without evidence for avascular necrosis. Mild bilateral hip osteoarthritis with moderate spurring about the acetabulum is seen in association with mild collar osteophytosis. Please note that this study was not tailored to evaluate for internal derangement. No hip joint effusion is seen. Lower lumbar spine: Lower lumbar spondylosis is partially imaged in association with facet arthrosis. There is moderate stress reaction about both L4-L5 and L5-S1 facet joints. Muscles/tendons: There is no disproportionate muscle atrophy or intramuscular edema. Insertional bilateral gluteal peritendinitis is noted. There is mild bilateral chronic hamstring origin tendinosis/old partial tear. Neurovascular structures: The fat planes surrounding both sciatic nerves are maintained. No significant groin lymphadenopathy is appreciated. Miscellaneous: The subcutaneous tissues are within normal limits. Small bilateral fat-containing groin hernias are partially imaged.  Neurodiagnostic Imagin) EMG/NCV (2024) - [facility] Right > left lumbar radiculopathy likely at L4/L5 with active and chronic findings  Physician Disclaimer: I have personally reviewed and confirmed all HPI data with the patient. [] : Post Surgical Visit: no [FreeTextEntry7] : B/L THIGH  [FreeTextEntry9] : Bursa injection [de-identified] : L MRI AT

## 2025-04-10 NOTE — PROCEDURE
[Large Joint Injection] : Large joint injection [Bilateral] : bilaterally of the [Pain] : pain [Alcohol] : alcohol [Ethyl Chloride sprayed topically] : ethyl chloride sprayed topically [Sterile technique used] : sterile technique used [___ cc    0.25%] : Bupivacaine (Marcaine) ~Vcc of 0.25%  [___ cc    40mg] : Triamcinolone (Kenalog) ~Vcc of 40 mg  [] : Patient tolerated procedure well [Call if redness, pain or fever occur] : call if redness, pain or fever occur [Apply ice for 15min out of every hour for the next 12-24 hours as tolerated] : apply ice for 15 minutes out of every hour for the next 12-24 hours as tolerated [Risks, benefits, alternatives discussed / Verbal consent obtained] : the risks benefits, and alternatives have been discussed, and verbal consent was obtained [Previous OTC use and PT nontherapeutic] : patient has tried OTC's including aspirin, Ibuprofen, Aleve, etc or prescription NSAIDS, and/or exercises at home and/or physical therapy without satisfactory response

## 2025-04-10 NOTE — ASSESSMENT
[FreeTextEntry1] : A thorough discussion occurred regarding available pain management treatment options including interventional, rehabilitative, pharmacological, and alternative modalities with the patient. We will proceed with the following:  Interventional treatment options: - proceed with repeat bilateral GTB CSI today for ongoing focal lateral hip pain - Proceed with bilateral L4-L5, L5-S1 facet joint MBB with fluoroscopic guidance, would proceed to RFA if adequate relief - Counseled patient regarding limitations of corticosteroid administration overall; he verbalizes understanding - see additional instructions below  Neuromodulation treatment options: - Prior [place] DCS trial; failed - Will not move forward with permanent implant given minimal reduction of symptoms during trial  Rehabilitative options: - Completed prior physical therapy trials - encouraged participation in HEP as tolerated - home exercise sheet provided at prior visit  Medication based treatment options: - failed several OTC and prescription NSAIDs trial; he defers further consideration - Continue meloxicam 7.5-15 mg daily as needed - Continue Voltaren gel up to TID as needed - Continue topical OTC analgesics (i.e. Lidoderm patch) - Continue pregabalin 50 mg TID; advised to discuss further titration with neurologist - see additional instructions below  Complementary treatment options: - Weight management and lifestyle modifications discussed  Additional treatment recommendations as follows: - Follow-up with orthopedic surgery (re: bilateral hips) as directed - Patient completed neurology workup regarding subjective leg weakness - Follow up 1-2 weeks post injection for assessment of efficacy and further treatment recommendations  We have discussed the risks, benefits, and alternatives NSAID therapy including but not limited to the risk of bleeding, thrombosis, gastric mucosal irritation/ulceration, allergic reaction and kidney dysfunction; the patient verbalizes an understanding.  The risks, benefits and alternatives of the proposed procedure were explained in detail with the patient. The risks outlined include but are not limited to infection, bleeding, nerve injury, a temporary increase in pain, failure to resolve symptoms, need for future interventions, allergic reaction, and possible elevation of blood sugar in diabetics if using corticosteroid.  All questions were answered to patient's apparent satisfaction, and he/she verbalized an understanding.  Patient presents with axial lumbar pain that has not responded to 3 months of conservative therapy including physical therapy or NSAID therapy.  The pain is interfering with activities of daily living and functionality.  There is no radicular pain.  The pain is exacerbated by facet loading.  Positive Kemps maneuver which is defined by pain reproduction with extension and rotation of the lumbar spine to the affected side.  The patient has not had a vertebral fusion at the levels of the proposed treatment.  There is no unexplained neurologic deficit.  There is no history of systemic infection, unstable medical condition, bleeding tendency, or local infection.  The injection is being performed to diagnose the facet joint as the source of the individual's pain, in preparation for a radiofrequency ablation.  I, Luly Segovia NP, acting as scribe, attest that this documentation has been prepared under the direction and in the presence of Provider Christian Sinha DO.    The documentation recorded by the scribe, in my presence, accurately reflects the service I personally performed, and the decisions made by me with my edits as appropriate.

## 2025-07-11 NOTE — PROCEDURE
[FreeTextEntry3] : Date of Service: 07/11/2025   Account: [id number]  Patient: EDWARD STEEN   YOB: 1948  Age: 76 year  Surgeon:                  Christian Sinha DO  Assistant:                None  Pre-Operative Diagnosis:   Lumbar Spondylosis      Post Operative Diagnosis:  Lumbar Spondylosis  Procedure:             Bilateral L4-5, L5-S1 facet block under fluoroscopic guidance.  This procedure was carried out using fluoroscopic guidance.  The risks and benefits of the procedure were discussed extensively with the patient.  The consent of the patient was obtained, and the following procedure was performed.   A timeout was performed with all essential staff present and the site and side were verified.  The lumbar vertebral bodies were identified, and the fluoroscope was obliqued ipsilateral to approximately 30 degrees to reveal the appropriate anatomical view.  The junction of the superior articulate process and transverse process at the right L4 and L5 levels were identified and marked.   The skin at these target points was then localized using 1 cc of 1% Lidocaine at each injection site.  A spinal needle was then introduced and advanced to the above target points at the junction of the SAP and transverse processes until bone was contacted.  Fluoroscope then focused on the right sacral ala on A/P view and marked at this point.  The skin and subcutaneous structures were localized using 1cc of 1.0 % lidocaine.  A spinal needle was then advanced under fluoroscopic guidance until bone was contacted at the ala.    After negative aspiration for heme and CSF 1 cc of 0.5% Marcaine was injected at each of the injection sites.  The procedure was performed in the exact same fashion on the contralateral left side at the L4, L5 and sacral ala levels.  Vital signs remained normal throughout the procedure.  The patient tolerated the procedure well.  There were no immediate complications from the performed procedure.  The patient was instructed to apply ice over the injection sites for twenty minutes every two hours for the next 24 hours.  Disposition:      1. The patient was advised to F/U in 1-2 weeks to assess the response to the injection.       2. They were advised to keep a pain diary to report the results of the diagnostic block at their FUV.      3. The patient was also instructed to contact me immediately if there were any concerns related to the procedure performed.

## 2025-07-29 NOTE — DATA REVIEWED
[Lumbar Spine] : lumbar spine [MRI] : MRI [Pelvis] : pelvis [Report was reviewed and noted in the chart] : The report was reviewed and noted in the chart [I independently reviewed and interpreted images and report] : I independently reviewed and interpreted images and report [I reviewed the films/CD] : I reviewed the films/CD

## 2025-07-30 NOTE — HISTORY OF PRESENT ILLNESS
[FreeTextEntry1] : 2025 - Patient presents for follow-up visit after bilateral L4-L5, L5-S1 facet joint MBB on 2025.  He reports  4/10/2025 - Patient presents for 3-month FUV. Patient reports significant, near resolution, of bilateral lateral hip pain when sleeping at night for ~ 1 month s/p BL GTB injections on 2025. Patient continues to report chronic axial low back pain when sleeping and when arising in the morning.  Low back pain worsens with prolonged seating, transitioning from sitting to standing positioning and with extension.  Denies any alarm signs of changes in medical history since last office visit.  Of note, patient was seen and evaluated by neurology and was started on pregabalin 50 mg TID without perceived benefit.  EMG/NCV was completed as well.  Presents today for repeat BL GTB injection and to discuss next steps in treatment plan.   2025 - Patient presents for 3-month FUV regarding their lower back pain. Patient reports he continues to complain of pain across his lower back and buttocks, as well as subjective weakness in bilateral legs. He followed up with a Dr. Martinez, a neurologist who did an EMG but has not received the results. He reports the bilateral GTB TPI have been effective to reduce his pain in the past and wishes to repeat this today.  10/31/2024 - Patient presents for reevaluation regarding their lower back pain. Patient reports no change in his symptoms. His pain is predominantly across his lower back and buttocks. He is experiencing an increase in subjective weakness in b/l legs which is his major concern. He followed up with Dr. Gongora to evaluate his hips and received b/l hip US CSI with no benefit.   6/3/2024 - Patient presents for FUV regarding their lower back pain. Patient reports that about 3 weeks ago he started to have a return of bilateral hip and buttock pain that radiates bilaterally down the lateral lower extremities, he continues to have pain across the lower back as well; he is currently involved in formal PT. he states that his pain had largely been resolved for over 2 months.  Sleep was improved.  3/14/2024 - Patient presents for FUV to review their pelvis MRI from 2024. Patient reports that he got 80-90% reduction of bilateral hip pain s/p GT bursa injections from the previous visit.  He no longer has right sided hip pain.  He continues to have intermittent left sided hip pain, and pain across the lower back.   2024 - Patient presents for FUV regarding his bilateral hip pain. Patient reports an increase of his right sided hip and radicular right calf pain since Thursday; laying on either side will exacerbate pain on the corresponding side. Patient will receive GTB TPI in office today.  Had MRI of the pelvis performed however no official radiological interpretation available at time of visit.  2024 - Patient presents for lead removal from [place] DCS trial 2024.  Unfortunately, he did not experience significant reduction of symptoms.  Pain is across the lower back with radiation bilaterally to the buttocks and lateral upper thighs; the pain is at its worst in the evening and night, and when laying on either side.   2024 - Patient presents for FUV regarding their lower back pain. Patient failed gabapentin and self discontinues, and he states that he is starting to have subjective weakness bilaterally in the lower extremities. His pain is across the lower back with radiation bilaterally to the buttocks and lateral upper thighs; the pain is at its worst in the evening and night.  Wishes to discuss DCS trial in further detail.  2023 - Patient presents for FUV.  He was last seen approximately 6 months ago.  Patient underwent a bilateral L4-5, L5-S1 facet joint mbb on 3/22/2023.  Patient reports no meaningful relief.  He c/o axial back pain, radiation into his BL buttocks and occasionally shoots down his leg.  Had orthopedic spine surgical reevaluation with Dr. Farfan.  At this point the patient remains uninterested in pursuing surgical intervention.  He presents today to discuss potential alternative treatment options.     3/9/2023- Patient presents for FUV regarding his lower back pain, had a lumbar spine MRI on 2023. Patient reports lower back pain, with radiation bilaterally to the buttocks and lower extremities (Pain distribution 80% back / 20% buttock/legs). He notes that the buttock pain is most prominent at night while sleeping; the lower back alternates in intensity between left and right depending on the day. He takes OTC NSAIDs for pain management with minimal reported benefit.  2023 - Presents for follow-up visit.  He was last seen for L4-L5 LESI on 10/28/2022.  He reports the most recent JENNIFER did not help at all compared to the first JENNIFER that he received.  He complains of axial back pain with radiation to his b/l buttock stopping at his calf.  He states lying flat seems to exacerbate the buttock pain.  Patient tried NSAIDs with minimal relief.  He is in the process of obtaining second opinion from spine surgery.    10/13/22 - Presents after left PM L4-L5 LESI on 22.  He reports more than 50% pain relief x 2 months, pain has since returned to baseline. Majority of his pain comes when he sleeps. Pain continues down b/l LE.  Has completed medical work-up for potential alternate sources of leg pain which have been reported as negative.  22 - Patient presents for a FUV. He reports he self d/c Celebrex due to perceived headaches and fatigue. Patient c/o low back pain radiating to the left buttock. Increase of pain with prolonged sitting. Patient reports 50/50 low back verse left buttock pain.   22 - Patient presents for a FUV following an left L4-5 LESI on 22. Patient reports injection was effective in reducing bilateral thigh symptoms. He reports 2-week benefit with both JENNIFER's. Patient reports residual low back pain radiating to the left buttocks. Patient reports he had PCP workup for leg cramping.   22 - Patient presents for a FUV following a bilateral L4-5 TFESI on 22. Patient states complete resolution the first 3 days post injection followed by minimal sustained relief. Patients' c/o bilateral leg pain / cramping with prolonged sitting. Patient states pain is worse in the evening.   22 - Patient presents for initial evaluation. Patient c/o lower back pain radiating into his bilateral buttocks and bilateral thighs. Patient rates his pain as 50-50 lower back vs lower extremity pain. Patient reports a longstanding history of this pain that has been worsening over the past 6 weeks due to no specific event. Patient recently started PT and has not noticed any appreciable reduction in his pain. Patient was prescribed Lyrica, however has not been able to start. Patient reports that sitting provokes his pain, walking does not. Patient reports that sitting provokes his lower extremity paresthesia's.   Interventional pain history (Dr. Sinha): 1) Bilateral L4-5 TFESI (22) 2) L4-5 LESI (22, 22, 10/28/2022)  3) Bilateral L4-5, L5-S1 facet joint MBB (3/22/2023, 2025) 4) [place] DCS trial (2024) 5) Bilateral GTB Injection (2024)  Injection therapies (outside MD): 1) B/L hip joint CSI with U/S guidance - (8/15/2024) Dr. Gongora   Pertinent Surgical History: N/A  Imagin) MRI Lumbar Spine (2024)- [facility]  At L5-S1: There is facet arthropathy and trace anterolisthesis Significant spinal canal stenosis or nerve root compression. There is no significant change from previous study. At L4-5: There is facet arthropathy and grade 1 anterolisthesis. There is mild to moderate bilateral neural foraminal stenosis. There is moderate stenosis of right subarticular recess. There is no significant change from previous study. At L3-4: Disc bulge and left subarticular/foraminal disc herniation with interval increase in size from previous examination. Disc herniation compresses the descending left L4 nerve root in subarticular recess. There is also subarticular recess stenosis on the right with suspected mild impingement descending right L4 nerve root. There is moderate right and mild left neuroforaminal stenosis with disc contacting exiting nerve root on the left. At L2-3: There is minimal disc bulge and facet arthropathy without significant spinal canal or neural foraminal stenosis. At L1-2: There is disc bulge and annular fissure without significant spinal canal or neural foraminal stenosis.  2) MRI Thoracic Spine (2024) - [facility]  3) MRI Pelvis (2024) - [facility]  Pelvis: T1 marrow signal intensity is maintained, without evidence for aggressive osseous lesion or fracture. There is no marrow edema. The sacroiliac joints are intact, without ankylosis or erosive change. The coccyx is intact. Hips: Both femoral heads demonstrate a spherical contour without evidence for avascular necrosis. Mild bilateral hip osteoarthritis with moderate spurring about the acetabulum is seen in association with mild collar osteophytosis. Please note that this study was not tailored to evaluate for internal derangement. No hip joint effusion is seen. Lower lumbar spine: Lower lumbar spondylosis is partially imaged in association with facet arthrosis. There is moderate stress reaction about both L4-L5 and L5-S1 facet joints. Muscles/tendons: There is no disproportionate muscle atrophy or intramuscular edema. Insertional bilateral gluteal peritendinitis is noted. There is mild bilateral chronic hamstring origin tendinosis/old partial tear. Neurovascular structures: The fat planes surrounding both sciatic nerves are maintained. No significant groin lymphadenopathy is appreciated. Miscellaneous: The subcutaneous tissues are within normal limits. Small bilateral fat-containing groin hernias are partially imaged.  Neurodiagnostic Imagin) EMG/NCV (2024) - [facility] Right > left lumbar radiculopathy likely at L4/L5 with active and chronic findings  Physician Disclaimer: I have personally reviewed and confirmed all HPI data with the patient.

## 2025-07-30 NOTE — PHYSICAL EXAM
[de-identified] : Constitutional:  - No acute distress  - Well developed; well nourished   Neurological:  - normal mood and affect  - alert and oriented x 3    Cardiovascular:  - grossly normal  Lumbar Spine Exam:   Inspection:  erythema (-)  ecchymosis (-)  rashes (-)  alignment: no scoliosis  Palpation:   paraspinal tenderness:                  L (-); R (-)  thoracic paraspinal tenderness:    L (-); R (-)  sciatic nerve tenderness :              L (-); R (-)  SI joint tenderness:                        L (-); R (-)  GTB tenderness:                            L (+); R (+)   ROM:   WNL; stiffness at extremes of extension pain with extension > flexion  Strength:                   Right       Left     Hip Flexion:                 (5/5)       (5/5)  Quadriceps:                (5/5)       (5/5)  Hamstrings:                 (5/5)       (5/5)  Ankle Dorsiflexion:      (4+/5)       (4+/5)  EHL:                            (5/5)       (5/5)  Ankle Plantarflexion:   (5/5)      (5/5)   Special Tests:  SLR:                      R (-); L (-)  Facet loading:       R (+); L (+)  MONTRELL test:          R (+); L (+) Tight Hamstrings   R (+); L (+)   Neurologic:  Light touch intact throughout LE bilaterally  Reflexes normal and symmetric bilaterally  Gait:  mildly- antalgic gait

## 2025-07-30 NOTE — HISTORY OF PRESENT ILLNESS
[FreeTextEntry1] : 2025 - Patient presents for follow-up visit after bilateral L4-L5, L5-S1 facet joint MBB on 2025.  He reports  4/10/2025 - Patient presents for 3-month FUV. Patient reports significant, near resolution, of bilateral lateral hip pain when sleeping at night for ~ 1 month s/p BL GTB injections on 2025. Patient continues to report chronic axial low back pain when sleeping and when arising in the morning.  Low back pain worsens with prolonged seating, transitioning from sitting to standing positioning and with extension.  Denies any alarm signs of changes in medical history since last office visit.  Of note, patient was seen and evaluated by neurology and was started on pregabalin 50 mg TID without perceived benefit.  EMG/NCV was completed as well.  Presents today for repeat BL GTB injection and to discuss next steps in treatment plan.   2025 - Patient presents for 3-month FUV regarding their lower back pain. Patient reports he continues to complain of pain across his lower back and buttocks, as well as subjective weakness in bilateral legs. He followed up with a Dr. Martinez, a neurologist who did an EMG but has not received the results. He reports the bilateral GTB TPI have been effective to reduce his pain in the past and wishes to repeat this today.  10/31/2024 - Patient presents for reevaluation regarding their lower back pain. Patient reports no change in his symptoms. His pain is predominantly across his lower back and buttocks. He is experiencing an increase in subjective weakness in b/l legs which is his major concern. He followed up with Dr. Gongora to evaluate his hips and received b/l hip US CSI with no benefit.   6/3/2024 - Patient presents for FUV regarding their lower back pain. Patient reports that about 3 weeks ago he started to have a return of bilateral hip and buttock pain that radiates bilaterally down the lateral lower extremities, he continues to have pain across the lower back as well; he is currently involved in formal PT. he states that his pain had largely been resolved for over 2 months.  Sleep was improved.  3/14/2024 - Patient presents for FUV to review their pelvis MRI from 2024. Patient reports that he got 80-90% reduction of bilateral hip pain s/p GT bursa injections from the previous visit.  He no longer has right sided hip pain.  He continues to have intermittent left sided hip pain, and pain across the lower back.   2024 - Patient presents for FUV regarding his bilateral hip pain. Patient reports an increase of his right sided hip and radicular right calf pain since Thursday; laying on either side will exacerbate pain on the corresponding side. Patient will receive GTB TPI in office today.  Had MRI of the pelvis performed however no official radiological interpretation available at time of visit.  2024 - Patient presents for lead removal from [place] DCS trial 2024.  Unfortunately, he did not experience significant reduction of symptoms.  Pain is across the lower back with radiation bilaterally to the buttocks and lateral upper thighs; the pain is at its worst in the evening and night, and when laying on either side.   2024 - Patient presents for FUV regarding their lower back pain. Patient failed gabapentin and self discontinues, and he states that he is starting to have subjective weakness bilaterally in the lower extremities. His pain is across the lower back with radiation bilaterally to the buttocks and lateral upper thighs; the pain is at its worst in the evening and night.  Wishes to discuss DCS trial in further detail.  2023 - Patient presents for FUV.  He was last seen approximately 6 months ago.  Patient underwent a bilateral L4-5, L5-S1 facet joint mbb on 3/22/2023.  Patient reports no meaningful relief.  He c/o axial back pain, radiation into his BL buttocks and occasionally shoots down his leg.  Had orthopedic spine surgical reevaluation with Dr. Farfan.  At this point the patient remains uninterested in pursuing surgical intervention.  He presents today to discuss potential alternative treatment options.     3/9/2023- Patient presents for FUV regarding his lower back pain, had a lumbar spine MRI on 2023. Patient reports lower back pain, with radiation bilaterally to the buttocks and lower extremities (Pain distribution 80% back / 20% buttock/legs). He notes that the buttock pain is most prominent at night while sleeping; the lower back alternates in intensity between left and right depending on the day. He takes OTC NSAIDs for pain management with minimal reported benefit.  2023 - Presents for follow-up visit.  He was last seen for L4-L5 LESI on 10/28/2022.  He reports the most recent JENNIFER did not help at all compared to the first JENNIFER that he received.  He complains of axial back pain with radiation to his b/l buttock stopping at his calf.  He states lying flat seems to exacerbate the buttock pain.  Patient tried NSAIDs with minimal relief.  He is in the process of obtaining second opinion from spine surgery.    10/13/22 - Presents after left PM L4-L5 LESI on 22.  He reports more than 50% pain relief x 2 months, pain has since returned to baseline. Majority of his pain comes when he sleeps. Pain continues down b/l LE.  Has completed medical work-up for potential alternate sources of leg pain which have been reported as negative.  22 - Patient presents for a FUV. He reports he self d/c Celebrex due to perceived headaches and fatigue. Patient c/o low back pain radiating to the left buttock. Increase of pain with prolonged sitting. Patient reports 50/50 low back verse left buttock pain.   22 - Patient presents for a FUV following an left L4-5 LESI on 22. Patient reports injection was effective in reducing bilateral thigh symptoms. He reports 2-week benefit with both JENNIFER's. Patient reports residual low back pain radiating to the left buttocks. Patient reports he had PCP workup for leg cramping.   22 - Patient presents for a FUV following a bilateral L4-5 TFESI on 22. Patient states complete resolution the first 3 days post injection followed by minimal sustained relief. Patients' c/o bilateral leg pain / cramping with prolonged sitting. Patient states pain is worse in the evening.   22 - Patient presents for initial evaluation. Patient c/o lower back pain radiating into his bilateral buttocks and bilateral thighs. Patient rates his pain as 50-50 lower back vs lower extremity pain. Patient reports a longstanding history of this pain that has been worsening over the past 6 weeks due to no specific event. Patient recently started PT and has not noticed any appreciable reduction in his pain. Patient was prescribed Lyrica, however has not been able to start. Patient reports that sitting provokes his pain, walking does not. Patient reports that sitting provokes his lower extremity paresthesia's.   Interventional pain history (Dr. Sinha): 1) Bilateral L4-5 TFESI (22) 2) L4-5 LESI (22, 22, 10/28/2022)  3) Bilateral L4-5, L5-S1 facet joint MBB (3/22/2023, 2025) 4) [place] DCS trial (2024) 5) Bilateral GTB Injection (2024)  Injection therapies (outside MD): 1) B/L hip joint CSI with U/S guidance - (8/15/2024) Dr. Gongora   Pertinent Surgical History: N/A  Imagin) MRI Lumbar Spine (2024)- [facility]  At L5-S1: There is facet arthropathy and trace anterolisthesis Significant spinal canal stenosis or nerve root compression. There is no significant change from previous study. At L4-5: There is facet arthropathy and grade 1 anterolisthesis. There is mild to moderate bilateral neural foraminal stenosis. There is moderate stenosis of right subarticular recess. There is no significant change from previous study. At L3-4: Disc bulge and left subarticular/foraminal disc herniation with interval increase in size from previous examination. Disc herniation compresses the descending left L4 nerve root in subarticular recess. There is also subarticular recess stenosis on the right with suspected mild impingement descending right L4 nerve root. There is moderate right and mild left neuroforaminal stenosis with disc contacting exiting nerve root on the left. At L2-3: There is minimal disc bulge and facet arthropathy without significant spinal canal or neural foraminal stenosis. At L1-2: There is disc bulge and annular fissure without significant spinal canal or neural foraminal stenosis.  2) MRI Thoracic Spine (2024) - [facility]  3) MRI Pelvis (2024) - [facility]  Pelvis: T1 marrow signal intensity is maintained, without evidence for aggressive osseous lesion or fracture. There is no marrow edema. The sacroiliac joints are intact, without ankylosis or erosive change. The coccyx is intact. Hips: Both femoral heads demonstrate a spherical contour without evidence for avascular necrosis. Mild bilateral hip osteoarthritis with moderate spurring about the acetabulum is seen in association with mild collar osteophytosis. Please note that this study was not tailored to evaluate for internal derangement. No hip joint effusion is seen. Lower lumbar spine: Lower lumbar spondylosis is partially imaged in association with facet arthrosis. There is moderate stress reaction about both L4-L5 and L5-S1 facet joints. Muscles/tendons: There is no disproportionate muscle atrophy or intramuscular edema. Insertional bilateral gluteal peritendinitis is noted. There is mild bilateral chronic hamstring origin tendinosis/old partial tear. Neurovascular structures: The fat planes surrounding both sciatic nerves are maintained. No significant groin lymphadenopathy is appreciated. Miscellaneous: The subcutaneous tissues are within normal limits. Small bilateral fat-containing groin hernias are partially imaged.  Neurodiagnostic Imagin) EMG/NCV (2024) - [facility] Right > left lumbar radiculopathy likely at L4/L5 with active and chronic findings  Physician Disclaimer: I have personally reviewed and confirmed all HPI data with the patient.

## 2025-07-30 NOTE — ASSESSMENT
[FreeTextEntry1] : A thorough discussion occurred regarding available pain management treatment options including interventional, rehabilitative, pharmacological, and alternative modalities with the patient. We will proceed with the following:  Interventional treatment options: - proceed with repeat bilateral GTB CSI today for ongoing focal lateral hip pain - Proceed with bilateral L4-L5, L5-S1 facet joint MBB with fluoroscopic guidance, would proceed to RFA if adequate relief - Counseled patient regarding limitations of corticosteroid administration overall; he verbalizes understanding - see additional instructions below  Neuromodulation treatment options: - Prior [place] DCS trial; failed - Will not move forward with permanent implant given minimal reduction of symptoms during trial  Rehabilitative options: - Completed prior physical therapy trials - encouraged participation in HEP as tolerated - home exercise sheet provided at prior visit  Medication based treatment options: - failed several OTC and prescription NSAIDs trial; he defers further consideration - Continue meloxicam 7.5-15 mg daily as needed - Continue Voltaren gel up to TID as needed - Continue topical OTC analgesics (i.e. Lidoderm patch) - Continue pregabalin 50 mg TID; advised to discuss further titration with neurologist - see additional instructions below  Complementary treatment options: - Weight management and lifestyle modifications discussed  Additional treatment recommendations as follows: - Follow-up with orthopedic surgery (re: bilateral hips) as directed - Patient completed neurology workup regarding subjective leg weakness - Follow up 1-2 weeks post injection for assessment of efficacy and further treatment recommendations  We have discussed the risks, benefits, and alternatives NSAID therapy including but not limited to the risk of bleeding, thrombosis, gastric mucosal irritation/ulceration, allergic reaction and kidney dysfunction; the patient verbalizes an understanding.  The risks, benefits and alternatives of the proposed procedure were explained in detail with the patient. The risks outlined include but are not limited to infection, bleeding, nerve injury, a temporary increase in pain, failure to resolve symptoms, need for future interventions, allergic reaction, and possible elevation of blood sugar in diabetics if using corticosteroid.  All questions were answered to patient's apparent satisfaction, and he/she verbalized an understanding.  Patient presents with axial lumbar pain that has not responded to 3 months of conservative therapy including physical therapy or NSAID therapy.  The pain is interfering with activities of daily living and functionality.  There is no radicular pain.  The pain is exacerbated by facet loading.  Positive Kemps maneuver which is defined by pain reproduction with extension and rotation of the lumbar spine to the affected side.  The patient has not had a vertebral fusion at the levels of the proposed treatment.  There is no unexplained neurologic deficit.  There is no history of systemic infection, unstable medical condition, bleeding tendency, or local infection.  The injection is being performed to diagnose the facet joint as the source of the individual's pain, in preparation for a radiofrequency ablation.  I, Luly Segovia NP, acting as scribe, attest that this documentation has been prepared under the direction and in the presence of Provider Christian Sinha DO.    The documentation recorded by the scribe, in my presence, accurately reflects the service I personally performed, and the decisions made by me with my edits as appropriate.

## 2025-07-30 NOTE — ASSESSMENT
[FreeTextEntry1] : A thorough discussion occurred regarding available pain management treatment options including interventional, rehabilitative, pharmacological, and alternative modalities with the patient. We will proceed with the following:  Interventional treatment options: - proceed with repeat bilateral GTB CSI today for ongoing focal lateral hip pain - Proceed with bilateral L4-L5, L5-S1 facet joint MBB with fluoroscopic guidance, would proceed to RFA if adequate relief - Counseled patient regarding limitations of corticosteroid administration overall; he verbalizes understanding - see additional instructions below  Neuromodulation treatment options: - Prior [place] DCS trial; failed - Will not move forward with permanent implant given minimal reduction of symptoms during trial  Rehabilitative options: - Completed prior physical therapy trials - encouraged participation in HEP as tolerated - home exercise sheet provided at prior visit  Medication based treatment options: - failed several OTC and prescription NSAIDs trial; he defers further consideration - Continue meloxicam 7.5-15 mg daily as needed - Continue Voltaren gel up to TID as needed - Continue topical OTC analgesics (i.e. Lidoderm patch) - Continue pregabalin 50 mg TID; advised to discuss further titration with neurologist - see additional instructions below  Complementary treatment options: - Weight management and lifestyle modifications discussed  Additional treatment recommendations as follows: - Follow-up with orthopedic surgery (re: bilateral hips) as directed - Patient completed neurology workup regarding subjective leg weakness - Follow up 1-2 weeks post injection for assessment of efficacy and further treatment recommendations  We have discussed the risks, benefits, and alternatives NSAID therapy including but not limited to the risk of bleeding, thrombosis, gastric mucosal irritation/ulceration, allergic reaction and kidney dysfunction; the patient verbalizes an understanding.  The risks, benefits and alternatives of the proposed procedure were explained in detail with the patient. The risks outlined include but are not limited to infection, bleeding, nerve injury, a temporary increase in pain, failure to resolve symptoms, need for future interventions, allergic reaction, and possible elevation of blood sugar in diabetics if using corticosteroid.  All questions were answered to patient's apparent satisfaction, and he/she verbalized an understanding.  Patient presents with axial lumbar pain that has not responded to 3 months of conservative therapy including physical therapy or NSAID therapy.  The pain is interfering with activities of daily living and functionality.  There is no radicular pain.  The pain is exacerbated by facet loading.  Positive Kemps maneuver which is defined by pain reproduction with extension and rotation of the lumbar spine to the affected side.  The patient has not had a vertebral fusion at the levels of the proposed treatment.  There is no unexplained neurologic deficit.  There is no history of systemic infection, unstable medical condition, bleeding tendency, or local infection.  The injection is being performed to diagnose the facet joint as the source of the individual's pain, in preparation for a radiofrequency ablation.  I, Luly Segovia NP, acting as scribe, attest that this documentation has been prepared under the direction and in the presence of Provider Christian Sinha DO.    The documentation recorded by the scribe, in my presence, accurately reflects the service I personally performed, and the decisions made by me with my edits as appropriate.

## 2025-07-30 NOTE — ASSESSMENT
[FreeTextEntry1] : A thorough discussion occurred regarding available pain management treatment options including interventional, rehabilitative, pharmacological, and alternative modalities with the patient. We will proceed with the following:  Interventional treatment options: - proceed with repeat bilateral GTB CSI today for ongoing focal lateral hip pain - Proceed with bilateral L4-L5, L5-S1 facet joint MBB with fluoroscopic guidance, would proceed to RFA if adequate relief - Counseled patient regarding limitations of corticosteroid administration overall; he verbalizes understanding - see additional instructions below  Neuromodulation treatment options: - Prior [place] DCS trial; failed - Will not move forward with permanent implant given minimal reduction of symptoms during trial  Rehabilitative options: - Completed prior physical therapy trials - encouraged participation in HEP as tolerated - home exercise sheet provided at prior visit  Medication based treatment options: - failed several OTC and prescription NSAIDs trial; he defers further consideration - Continue meloxicam 7.5-15 mg daily as needed - Continue Voltaren gel up to TID as needed - Continue topical OTC analgesics (i.e. Lidoderm patch) - Continue pregabalin 50 mg TID; advised to discuss further titration with neurologist - see additional instructions below  Complementary treatment options: - Weight management and lifestyle modifications discussed  Additional treatment recommendations as follows: - Follow-up with orthopedic surgery (re: bilateral hips) as directed - Patient completed neurology workup regarding subjective leg weakness - Follow up 1-2 weeks post injection for assessment of efficacy and further treatment recommendations  We have discussed the risks, benefits, and alternatives NSAID therapy including but not limited to the risk of bleeding, thrombosis, gastric mucosal irritation/ulceration, allergic reaction and kidney dysfunction; the patient verbalizes an understanding.  The risks, benefits and alternatives of the proposed procedure were explained in detail with the patient. The risks outlined include but are not limited to infection, bleeding, nerve injury, a temporary increase in pain, failure to resolve symptoms, need for future interventions, allergic reaction, and possible elevation of blood sugar in diabetics if using corticosteroid.  All questions were answered to patient's apparent satisfaction, and he/she verbalized an understanding.  Patient presents with axial lumbar pain that has not responded to 3 months of conservative therapy including physical therapy or NSAID therapy.  The pain is interfering with activities of daily living and functionality.  There is no radicular pain.  The pain is exacerbated by facet loading.  Positive Kemps maneuver which is defined by pain reproduction with extension and rotation of the lumbar spine to the affected side.  The patient has not had a vertebral fusion at the levels of the proposed treatment.  There is no unexplained neurologic deficit.  There is no history of systemic infection, unstable medical condition, bleeding tendency, or local infection.  The injection is being performed to diagnose the facet joint as the source of the individual's pain, in preparation for a radiofrequency ablation.  I, Luly Segovia NP, acting as scribe, attest that this documentation has been prepared under the direction and in the presence of Provider Christian Sinha DO.    The documentation recorded by the scribe, in my presence, accurately reflects the service I personally performed, and the decisions made by me with my edits as appropriate.

## 2025-07-30 NOTE — PHYSICAL EXAM
[de-identified] : Constitutional:  - No acute distress  - Well developed; well nourished   Neurological:  - normal mood and affect  - alert and oriented x 3    Cardiovascular:  - grossly normal  Lumbar Spine Exam:   Inspection:  erythema (-)  ecchymosis (-)  rashes (-)  alignment: no scoliosis  Palpation:   paraspinal tenderness:                  L (-); R (-)  thoracic paraspinal tenderness:    L (-); R (-)  sciatic nerve tenderness :              L (-); R (-)  SI joint tenderness:                        L (-); R (-)  GTB tenderness:                            L (+); R (+)   ROM:   WNL; stiffness at extremes of extension pain with extension > flexion  Strength:                   Right       Left     Hip Flexion:                 (5/5)       (5/5)  Quadriceps:                (5/5)       (5/5)  Hamstrings:                 (5/5)       (5/5)  Ankle Dorsiflexion:      (4+/5)       (4+/5)  EHL:                            (5/5)       (5/5)  Ankle Plantarflexion:   (5/5)      (5/5)   Special Tests:  SLR:                      R (-); L (-)  Facet loading:       R (+); L (+)  MONTRELL test:          R (+); L (+) Tight Hamstrings   R (+); L (+)   Neurologic:  Light touch intact throughout LE bilaterally  Reflexes normal and symmetric bilaterally  Gait:  mildly- antalgic gait

## 2025-07-30 NOTE — PHYSICAL EXAM
[de-identified] : Constitutional:  - No acute distress  - Well developed; well nourished   Neurological:  - normal mood and affect  - alert and oriented x 3    Cardiovascular:  - grossly normal  Lumbar Spine Exam:   Inspection:  erythema (-)  ecchymosis (-)  rashes (-)  alignment: no scoliosis  Palpation:   paraspinal tenderness:                  L (-); R (-)  thoracic paraspinal tenderness:    L (-); R (-)  sciatic nerve tenderness :              L (-); R (-)  SI joint tenderness:                        L (-); R (-)  GTB tenderness:                            L (+); R (+)   ROM:   WNL; stiffness at extremes of extension pain with extension > flexion  Strength:                   Right       Left     Hip Flexion:                 (5/5)       (5/5)  Quadriceps:                (5/5)       (5/5)  Hamstrings:                 (5/5)       (5/5)  Ankle Dorsiflexion:      (4+/5)       (4+/5)  EHL:                            (5/5)       (5/5)  Ankle Plantarflexion:   (5/5)      (5/5)   Special Tests:  SLR:                      R (-); L (-)  Facet loading:       R (+); L (+)  MONTRELL test:          R (+); L (+) Tight Hamstrings   R (+); L (+)   Neurologic:  Light touch intact throughout LE bilaterally  Reflexes normal and symmetric bilaterally  Gait:  mildly- antalgic gait

## 2025-07-30 NOTE — HISTORY OF PRESENT ILLNESS
[FreeTextEntry1] : 2025 - Patient presents for follow-up visit after bilateral L4-L5, L5-S1 facet joint MBB on 2025.  He reports  4/10/2025 - Patient presents for 3-month FUV. Patient reports significant, near resolution, of bilateral lateral hip pain when sleeping at night for ~ 1 month s/p BL GTB injections on 2025. Patient continues to report chronic axial low back pain when sleeping and when arising in the morning.  Low back pain worsens with prolonged seating, transitioning from sitting to standing positioning and with extension.  Denies any alarm signs of changes in medical history since last office visit.  Of note, patient was seen and evaluated by neurology and was started on pregabalin 50 mg TID without perceived benefit.  EMG/NCV was completed as well.  Presents today for repeat BL GTB injection and to discuss next steps in treatment plan.   2025 - Patient presents for 3-month FUV regarding their lower back pain. Patient reports he continues to complain of pain across his lower back and buttocks, as well as subjective weakness in bilateral legs. He followed up with a Dr. Martinez, a neurologist who did an EMG but has not received the results. He reports the bilateral GTB TPI have been effective to reduce his pain in the past and wishes to repeat this today.  10/31/2024 - Patient presents for reevaluation regarding their lower back pain. Patient reports no change in his symptoms. His pain is predominantly across his lower back and buttocks. He is experiencing an increase in subjective weakness in b/l legs which is his major concern. He followed up with Dr. Gongora to evaluate his hips and received b/l hip US CSI with no benefit.   6/3/2024 - Patient presents for FUV regarding their lower back pain. Patient reports that about 3 weeks ago he started to have a return of bilateral hip and buttock pain that radiates bilaterally down the lateral lower extremities, he continues to have pain across the lower back as well; he is currently involved in formal PT. he states that his pain had largely been resolved for over 2 months.  Sleep was improved.  3/14/2024 - Patient presents for FUV to review their pelvis MRI from 2024. Patient reports that he got 80-90% reduction of bilateral hip pain s/p GT bursa injections from the previous visit.  He no longer has right sided hip pain.  He continues to have intermittent left sided hip pain, and pain across the lower back.   2024 - Patient presents for FUV regarding his bilateral hip pain. Patient reports an increase of his right sided hip and radicular right calf pain since Thursday; laying on either side will exacerbate pain on the corresponding side. Patient will receive GTB TPI in office today.  Had MRI of the pelvis performed however no official radiological interpretation available at time of visit.  2024 - Patient presents for lead removal from [place] DCS trial 2024.  Unfortunately, he did not experience significant reduction of symptoms.  Pain is across the lower back with radiation bilaterally to the buttocks and lateral upper thighs; the pain is at its worst in the evening and night, and when laying on either side.   2024 - Patient presents for FUV regarding their lower back pain. Patient failed gabapentin and self discontinues, and he states that he is starting to have subjective weakness bilaterally in the lower extremities. His pain is across the lower back with radiation bilaterally to the buttocks and lateral upper thighs; the pain is at its worst in the evening and night.  Wishes to discuss DCS trial in further detail.  2023 - Patient presents for FUV.  He was last seen approximately 6 months ago.  Patient underwent a bilateral L4-5, L5-S1 facet joint mbb on 3/22/2023.  Patient reports no meaningful relief.  He c/o axial back pain, radiation into his BL buttocks and occasionally shoots down his leg.  Had orthopedic spine surgical reevaluation with Dr. Farfan.  At this point the patient remains uninterested in pursuing surgical intervention.  He presents today to discuss potential alternative treatment options.     3/9/2023- Patient presents for FUV regarding his lower back pain, had a lumbar spine MRI on 2023. Patient reports lower back pain, with radiation bilaterally to the buttocks and lower extremities (Pain distribution 80% back / 20% buttock/legs). He notes that the buttock pain is most prominent at night while sleeping; the lower back alternates in intensity between left and right depending on the day. He takes OTC NSAIDs for pain management with minimal reported benefit.  2023 - Presents for follow-up visit.  He was last seen for L4-L5 LESI on 10/28/2022.  He reports the most recent JENNIFER did not help at all compared to the first JENNIFER that he received.  He complains of axial back pain with radiation to his b/l buttock stopping at his calf.  He states lying flat seems to exacerbate the buttock pain.  Patient tried NSAIDs with minimal relief.  He is in the process of obtaining second opinion from spine surgery.    10/13/22 - Presents after left PM L4-L5 LESI on 22.  He reports more than 50% pain relief x 2 months, pain has since returned to baseline. Majority of his pain comes when he sleeps. Pain continues down b/l LE.  Has completed medical work-up for potential alternate sources of leg pain which have been reported as negative.  22 - Patient presents for a FUV. He reports he self d/c Celebrex due to perceived headaches and fatigue. Patient c/o low back pain radiating to the left buttock. Increase of pain with prolonged sitting. Patient reports 50/50 low back verse left buttock pain.   22 - Patient presents for a FUV following an left L4-5 LESI on 22. Patient reports injection was effective in reducing bilateral thigh symptoms. He reports 2-week benefit with both JENNIFER's. Patient reports residual low back pain radiating to the left buttocks. Patient reports he had PCP workup for leg cramping.   22 - Patient presents for a FUV following a bilateral L4-5 TFESI on 22. Patient states complete resolution the first 3 days post injection followed by minimal sustained relief. Patients' c/o bilateral leg pain / cramping with prolonged sitting. Patient states pain is worse in the evening.   22 - Patient presents for initial evaluation. Patient c/o lower back pain radiating into his bilateral buttocks and bilateral thighs. Patient rates his pain as 50-50 lower back vs lower extremity pain. Patient reports a longstanding history of this pain that has been worsening over the past 6 weeks due to no specific event. Patient recently started PT and has not noticed any appreciable reduction in his pain. Patient was prescribed Lyrica, however has not been able to start. Patient reports that sitting provokes his pain, walking does not. Patient reports that sitting provokes his lower extremity paresthesia's.   Interventional pain history (Dr. Sinha): 1) Bilateral L4-5 TFESI (22) 2) L4-5 LESI (22, 22, 10/28/2022)  3) Bilateral L4-5, L5-S1 facet joint MBB (3/22/2023, 2025) 4) [place] DCS trial (2024) 5) Bilateral GTB Injection (2024)  Injection therapies (outside MD): 1) B/L hip joint CSI with U/S guidance - (8/15/2024) Dr. Gongora   Pertinent Surgical History: N/A  Imagin) MRI Lumbar Spine (2024)- [facility]  At L5-S1: There is facet arthropathy and trace anterolisthesis Significant spinal canal stenosis or nerve root compression. There is no significant change from previous study. At L4-5: There is facet arthropathy and grade 1 anterolisthesis. There is mild to moderate bilateral neural foraminal stenosis. There is moderate stenosis of right subarticular recess. There is no significant change from previous study. At L3-4: Disc bulge and left subarticular/foraminal disc herniation with interval increase in size from previous examination. Disc herniation compresses the descending left L4 nerve root in subarticular recess. There is also subarticular recess stenosis on the right with suspected mild impingement descending right L4 nerve root. There is moderate right and mild left neuroforaminal stenosis with disc contacting exiting nerve root on the left. At L2-3: There is minimal disc bulge and facet arthropathy without significant spinal canal or neural foraminal stenosis. At L1-2: There is disc bulge and annular fissure without significant spinal canal or neural foraminal stenosis.  2) MRI Thoracic Spine (2024) - [facility]  3) MRI Pelvis (2024) - [facility]  Pelvis: T1 marrow signal intensity is maintained, without evidence for aggressive osseous lesion or fracture. There is no marrow edema. The sacroiliac joints are intact, without ankylosis or erosive change. The coccyx is intact. Hips: Both femoral heads demonstrate a spherical contour without evidence for avascular necrosis. Mild bilateral hip osteoarthritis with moderate spurring about the acetabulum is seen in association with mild collar osteophytosis. Please note that this study was not tailored to evaluate for internal derangement. No hip joint effusion is seen. Lower lumbar spine: Lower lumbar spondylosis is partially imaged in association with facet arthrosis. There is moderate stress reaction about both L4-L5 and L5-S1 facet joints. Muscles/tendons: There is no disproportionate muscle atrophy or intramuscular edema. Insertional bilateral gluteal peritendinitis is noted. There is mild bilateral chronic hamstring origin tendinosis/old partial tear. Neurovascular structures: The fat planes surrounding both sciatic nerves are maintained. No significant groin lymphadenopathy is appreciated. Miscellaneous: The subcutaneous tissues are within normal limits. Small bilateral fat-containing groin hernias are partially imaged.  Neurodiagnostic Imagin) EMG/NCV (2024) - [facility] Right > left lumbar radiculopathy likely at L4/L5 with active and chronic findings  Physician Disclaimer: I have personally reviewed and confirmed all HPI data with the patient.